# Patient Record
Sex: FEMALE | Race: WHITE | NOT HISPANIC OR LATINO | URBAN - METROPOLITAN AREA
[De-identification: names, ages, dates, MRNs, and addresses within clinical notes are randomized per-mention and may not be internally consistent; named-entity substitution may affect disease eponyms.]

---

## 2024-03-04 ENCOUNTER — APPOINTMENT (OUTPATIENT)
Dept: URBAN - METROPOLITAN AREA SURGERY 20 | Age: 69
Setting detail: DERMATOLOGY
End: 2024-03-05

## 2024-03-04 VITALS — HEART RATE: 83 BPM | SYSTOLIC BLOOD PRESSURE: 150 MMHG | TEMPERATURE: 98 F | DIASTOLIC BLOOD PRESSURE: 85 MMHG

## 2024-03-04 PROBLEM — C43.39 MALIGNANT MELANOMA OF OTHER PARTS OF FACE: Status: ACTIVE | Noted: 2024-03-04

## 2024-03-04 PROCEDURE — 12053 INTMD RPR FACE/MM 5.1-7.5 CM: CPT

## 2024-03-04 PROCEDURE — OTHER ADDITIONAL PATHOLOGY SPECIMEN: OTHER

## 2024-03-04 PROCEDURE — 88342 IMHCHEM/IMCYTCHM 1ST ANTB: CPT | Mod: 59

## 2024-03-04 PROCEDURE — OTHER MIPS QUALITY: OTHER

## 2024-03-04 PROCEDURE — OTHER MOHS SURGERY: OTHER

## 2024-03-04 PROCEDURE — 17311 MOHS 1 STAGE H/N/HF/G: CPT

## 2024-03-04 NOTE — PROCEDURE: MIPS QUALITY
Quality 47: Advance Care Plan: Advance Care Planning discussed and documented in the medical record; patient did not wish or was not able to name a surrogate decision maker or provide an advance care plan.
Quality 226: Preventive Care And Screening: Tobacco Use: Screening And Cessation Intervention: Patient screened for tobacco use and is an ex/non-smoker
Detail Level: Detailed
Quality 137: Melanoma: Continuity Of Care - Recall System: Documentation of system reason(s) for not entering patient's information into a recall system (eg, melanoma being monitored by another physician provider)

## 2024-03-04 NOTE — PROCEDURE: ADDITIONAL PATHOLOGY SPECIMEN
Render Path Notes In Note?: Yes
Notification Instructions: Patient will be notified of biopsy results. However, patient instructed to call the office if not contacted within 2 weeks.
X Size Of Lesion In Cm (Optional): 0
Lab: [id number]
Billing Type: Third-Party Bill
Body Location Override (Optional - Billing Will Still Be Based On Selected Body Map Location If Applicable): right submandibular
Detail Level: Detailed
Biopsy Type: H and E (Permanent Final Margin)
Path Notes (To The Dermatopathologist): Immuno Mohs debulk submitted for MIS.\\nPrevious Biopsy #:-[id number]
Path Notes (To The Dermatopathologist): Mohs deep margin for MIS, rule out underlying desmoplastic melanoma
Path Notes (To The Dermatopathologist): Immuno mohs layer (  2.9x  2.3x  0.3) EMBED ONLY.

## 2024-03-04 NOTE — PROCEDURE: MOHS SURGERY
Referred To Plastics For Closure Text (Leave Blank If You Do Not Want): After obtaining clear surgical margins the patient was sent to plastics for surgical repair.  The patient understands they will receive post-surgical care and follow-up from the referring physician's office.
Bcc Superficial Pigmented Histology Text: There were aggregates of basaloid cells with pigment budding from the epidermis.
Is There Clinical Or Radiological Involvement Of A Named Nerve (Microscopic Perineural Invasion Handled In Stages Tab)?: No
Melolabial Interpolation Flap Text: An interpolation flap was designed on the adjacent paranasal cheek skin in a triangular island configuration.  The flap was then incised and elevated, and undermined to the pedicle base utilizing perforating arterioles for vascular supply.  The edges were bluntly undermined at the periphery of the donor and recipient beds.  Adipose tissue was trimmed from the distal flap.  The flap was then carried over and sutured into the wound using key buried vertical mattress sutures and multiple Prolene simple interrupted sutures.  The donor defect was closed with multiple buried sutures, and interrupted and running sutures.  Xeroform dressing was placed behind the flap's pedicle.
Consent 2/Introductory Paragraph: Mohs surgery was explained to the patient and consent was obtained. The risks, benefits and alternatives to therapy were discussed in detail. Specifically, the risks of infection, scarring, bleeding, prolonged wound healing, incomplete removal, allergy to anesthesia, nerve injury and recurrence were addressed. Prior to the procedure, the treatment site was clearly identified and confirmed by the patient. All components of Universal Protocol/PAUSE Rule completed.
Partial Purse String (Simple) Text: Given the location of the defect and the characteristics of the surrounding skin a simple purse string closure was deemed most appropriate.  Undermining was performed circumferentially around the surgical defect.  A purse string suture was then placed and tightened. Wound tension only allowed a partial closure of the circular defect.
Oculoplastic Surgeon Procedure Text (D): After obtaining clear surgical margins the patient was sent to oculoplastics for surgical repair.  The patient understands they will receive post-surgical care and follow-up from the referring physician's office.
Quadrants Reporting?: 0
Hemigard Postcare Instructions: The HEMIGARD strips are to remain completely dry for at least 5-7 days.
Show Mohs Stages In Case Summary (If You Hide Here Stages Will Be Calculated By Your Documentation In The Stages Tab)?: Yes
Anesthesia Volume In Cc: 2
Eyelid Full Thickness Repair - 49553: The eyelid defect was full thickness which required a wedge repair of the eyelid. Special care was taken to ensure that the eyelid margin was realligned when placing sutures.
Stage 8: Additional Anesthesia Type: 1% lidocaine with epinephrine
Pan-Cytokeratin - Negative Histology Text: CK staining demonstrates a normal density and pattern.
Ftsg Text: A wound-size template was made using a non-adherent dressing.  The template was then outlined on the donor site with gentian violet.  Local anesthesia was obtained.  The excision at the donor site was carried down to subcutaneous fat.  The harvested graft was placed on a saline gauze.  The donor site was closed by intermediate layered closure. The graft was defatted with blunt scissors.  The graft was then placed on the recipient site and secured at the perimeter.  Careful attention was given to even approximation of the wound edges.  A tie-over bolster dressing with Xeroform gauze and suture was placed over the graft to provide compression.  A pressure dressing was applied to the donor site.
Why Was The Change Made?: Please Select the Appropriate Response
Partial Purse String (Intermediate) Text: Given the location of the defect and the characteristics of the surrounding skin an intermediate purse string closure was deemed most appropriate.  Undermining was performed circumferentially around the surgical defect.  A purse string suture was then placed and tightened. Wound tension only allowed a partial closure of the circular defect.
Surgeon/Pathologist Verbiage (Will Incorporate Name Of Surgeon From Intro If Not Blank): operated in two distinct and integrated capacities as the surgeon and pathologist.
Island Pedicle Flap-Requiring Vessel Identification Text: The defect edges were debeveled with a #15 scalpel blade.  Given the location of the defect, shape of the defect and the proximity to free margins an island pedicle advancement flap was deemed most appropriate.  Using a sterile surgical marker, an appropriate advancement flap was drawn, based on the axial vessel mentioned above, incorporating the defect, outlining the appropriate donor tissue and placing the expected incisions within the relaxed skin tension lines where possible.    The area thus outlined was incised deep to adipose tissue with a #15 scalpel blade.  The skin margins were undermined to an appropriate distance in all directions around the primary defect and laterally outward around the island pedicle utilizing iris scissors.  There was minimal undermining beneath the pedicle flap.
Estimated Blood Loss (Cc): minimal
Tumor Debulked?: dermablade
Bi-Rhombic Flap Text: A bi-rhombic transposition flap was designed.  The flap was incised to the underlying adipose tissue.  The tissue surrounding the operative site was undermined extensively with blunt scissor dissection.    The flap was then incised, undermined, elevated, defatted, and carried over the primary defect.  The secondary defect was first closed by complex layered closure, moving the flap into the primary defect.  The superficial fascia and subcutaneous layers were closed with buried vertical mattress sutures.  The epidermis was then approximated with simple interrupted and running sutures.  Careful attention was given to eversion and even approximation of the wound edges.
Scc In Situ With Follicular Extension Histology Text: There was full thickness epidermal squamous cell atypia and proliferation in a SCCIS pattern with overlying hypogranulosis, parakeratosis, and follicular or adnexal extension.
Composite Graft Text: A defect size template was made with Telfa and placed on the aspect of the ear, where the convexity and curvature correctly matched the recipient bed.  The graft was excised to include the underlying cartilage and immediately placed in cold saline solution.  Hemostasis with electrocoagulation.  The donor defect was closed with buried vertical mattress sutures followed by simple interrupted sutures.  Standing tissue cones were removed by the standard technique.  Subcutaneous fat was trimmed from the graft.  The composite graft was then placed in the defect and secured with basting sutures.  The edges were precisely approximated with simple interrupted sutures, with care to provide edge eversion.
Orbicularis Oris Muscle Flap Text: The defect edges were debeveled with a #15 scalpel blade.  Given that the defect affected the competency of the oral sphincter an orbicularis oris muscle flap was deemed most appropriate to restore this competency and normal muscle function.  Using a sterile surgical marker, an appropriate flap was drawn incorporating the defect. The area thus outlined was incised with a #15 scalpel blade.
Same Histology In Subsequent Stages Text: The pattern and morphology of the tumor is as described in the first stage.
Asc Procedure Text (A): After obtaining clear surgical margins the patient was sent to an ASC for surgical repair.  The patient understands they will receive post-surgical care and follow-up from the ASC physician.
Mid-Level Procedure Text (F): After obtaining clear surgical margins the patient was sent to a mid-level provider for surgical repair.  The patient understands they will receive post-surgical care and follow-up from the mid-level provider.
Surgical Defect Length In Cm (Optional): 3.1
Closure 2 Information: This tab is for additional flaps and grafts, including complex repair and grafts and complex repair and flaps. You can also specify a different location for the additional defect, if the location is the same you do not need to select a new one. We will insert the automated text for the repair you select below just as we do for solitary flaps and grafts. Please note that at this time if you select a location with a different insurance zone you will need to override the ICD10 and CPT if appropriate.
Provider Procedure Text (A): After obtaining clear surgical margins the defect was repaired by another provider.
Localized Dermabrasion With Wire Brush Text: The patient was draped in routine manner.  Localized dermabrasion using 220 grit sterile sandpaper was performed in routine manner to papillary dermis. This spot dermabrasion is being performed to complete skin cancer reconstruction. It also will eliminate the other sun damaged precancerous cells that are known to be part of the regional effect of a lifetime's worth of sun exposure. This localized dermabrasion is therapeutic and should not be considered cosmetic in any regard.
Double Island Pedicle Flap Text: The defect edges were debeveled with a #15 scalpel blade.  Given the location of the defect, shape of the defect and the proximity to free margins a double island pedicle advancement flap was deemed most appropriate.  Using a sterile surgical marker, an appropriate advancement flap was drawn incorporating the defect, outlining the appropriate donor tissue and placing the expected incisions within the relaxed skin tension lines where possible.    The area thus outlined was incised deep to adipose tissue with a #15 scalpel blade.  The skin margins were undermined to an appropriate distance in all directions around the primary defect and laterally outward around the island pedicle utilizing iris scissors.  There was minimal undermining beneath the pedicle flap.
Nostril Rim Text: The closure involved the nostril rim.
Tarsorrhaphy Text: A tarsorrhaphy was performed using Frost sutures.
Wound Check: 28 days
Scc Well Differentiated Histology Text: There were aggregates of well-differentiated squamous cells.
No Repair - Repaired With Adjacent Surgical Defect Text (Leave Blank If You Do Not Want): After obtaining clear surgical margins the defect was repaired concurrently with the other surgical defect which was in close approximation.
Bilobed Transposition Flap Text: A geometric bilobed transposition flap was designed.  The flap was incised to the underlying adipose tissue. The flap was then undermined, elevated, and carried over the primary defect and sutured into place to its pedicle base.  The entire tissue surrounding the operative site was undermined extensively with blunt scissor dissection.  The primary lobe was then defatted using blunt scissor dissection.  The secondary lobe was carried over to cover the secondary defect created by the first lobe of the flap.  The primary lobe of the flap was then carried over the primary defect.  Both lobes of the flap were trimmed to fit the recipient sites as needed.  The superficial fascia and subcutaneous layers were closed with buried vertical mattress sutures.  The epidermis was approximated with simple interrupted and running sutures.  Careful attention was given to eversion and even approximation of the wound edges.
Bilateral Rotation Flap Text: A bilateral rotation flap was designed.  The flap was incised to the underlying adipose tissue.  The tissue surrounding the operative site was undermined extensively with blunt scissor dissection.  The flap was then undermined, elevated, rotated and carried over the primary defect and sutured into place.  Hemostasis was obtained using electrocoagulation.  Raised tissue cones were removed as necessary by standard technique.  The superficial fascia and subcutaneous layers were closed with buried vertical mattress sutures.  The epidermis was then approximated with simple interrupted and running sutures.  Careful attention was given to eversion and even approximation of the wound edges.
Scc Pigmented Histology Text: There were aggregates of squamous cells with pigment.
Consent (Lip)/Introductory Paragraph: The rationale for Mohs was explained to the patient and consent was obtained. The risks, benefits and alternatives to therapy were discussed in detail. Specifically, the risks of lip deformity, changes in the oral aperture, infection, scarring, bleeding, prolonged wound healing, incomplete removal, allergy to anesthesia, nerve injury and recurrence were addressed. Prior to the procedure, the treatment site was clearly identified and confirmed by the patient. All components of Universal Protocol/PAUSE Rule completed.
Manual Repair Warning Statement: We plan on removing the manually selected variable below in favor of our much easier automatic structured text blocks found in the previous tab. We decided to do this to help make the flow better and give you the full power of structured data. Manual selection is never going to be ideal in our platform and I would encourage you to avoid using manual selection from this point on, especially since I will be sunsetting this feature. It is important that you do one of two things with the customized text below. First, you can save all of the text in a word file so you can have it for future reference. Second, transfer the text to the appropriate area in the Library tab. Lastly, if there is a flap or graft type which we do not have you need to let us know right away so I can add it in before the variable is hidden. No need to panic, we plan to give you roughly 6 months to make the change.
Inflammation Suggestive Of Cancer Camouflage Histology Text: There was a dense lymphocytic infiltrate which prevented adequate histologic evaluation of adjacent structures and camouflaged potential tumor present.
Number Of Hemigard Strips Per Side: 1
Lazy S Intermediate Repair Preamble Text (Leave Blank If You Do Not Want): Undermining was performed with blunt dissection.
Postop Diagnosis: same
Hemostasis: Electrocoagulation
Retention Suture Text: Retention sutures were placed to support the closure and prevent dehiscence.
Otolaryngologist Procedure Text (E): After obtaining clear surgical margins the patient was sent to otolaryngology for surgical repair.  The patient understands they will receive post-surgical care and follow-up from the referring physician's office.
Special Stains Stage 8 - Results: Base On Clearance Noted Above
Mixed Nodular And Micronodular Bcc Histology Text: There were aggregates of basaloid cells in a nodular and micro nodular pattern.
Bcc Pigmented Histology Text: There were aggregates of basaloid cells with pigment.
Split-Thickness Skin Graft Text: The defect edges were debeveled with a #15 scalpel blade.  Given the location of the defect, shape of the defect and the proximity to free margins a split thickness skin graft was deemed most appropriate.  Using a sterile surgical marker, the primary defect shape was transferred to the donor site. The split thickness graft was then harvested.  The skin graft was then placed in the primary defect and oriented appropriately.
V-Y Plasty Text: The defect edges were debeveled with a #15 scalpel blade.  Given the location of the defect, shape of the defect and the proximity to free margins an V-Y advancement flap was deemed most appropriate.  Using a sterile surgical marker, an appropriate advancement flap was drawn incorporating the defect and placing the expected incisions within the relaxed skin tension lines where possible.    The area thus outlined was incised deep to adipose tissue with a #15 scalpel blade.  The skin margins were undermined to an appropriate distance in all directions utilizing iris scissors.
O-Z Flap Text: An O-Z rotation flap was designed.  The flap was incised to the underlying adipose tissue.  The flap was then undermined, elevated, and carried over the primary defect and sutured into place and elevated, rotated, and carried over into the primary defect, creating a secondary defect.  Raised tissue cones were removed as necessary by standard technique. The superficial fascia and subcutaneous layers were closed with buried vertical mattress sutures. The epidermis was then approximated with simple interrupted and running sutures.  Careful attention was given to eversion and even approximation of the wound edges.
Subsequent Stages Histo Method Verbiage: Using a similar technique to that described above, a thin layer of tissue was removed from all areas where tumor was visible on the previous stage.  The tissue was again oriented, mapped, dyed, and processed as above.
Rhomboid Transposition Flap Text: A rhombic transposition flap was designed.  The flap was incised to the underlying adipose tissue.  The tissue surrounding the operative site was undermined extensively with blunt scissor dissection.    The flap was then incised, undermined, elevated, defatted, and carried over the primary defect.  The secondary defect was first closed by complex layered closure, moving the flap into the primary defect.  The superficial fascia and subcutaneous layers were closed with buried vertical mattress sutures.  The epidermis was then approximated with simple interrupted and running sutures.  Careful attention was given to eversion and even approximation of the wound edges.
Bcc  Nodular Histology Text: There were numerous large/nodular aggregates of palisaded basaloid cells with peripheral clefting
Consent 1/Introductory Paragraph: The rationale for Mohs was explained to the patient and consent was obtained. The risks, benefits and alternatives to therapy were discussed in detail. Specifically, the risks of infection, scarring, bleeding, prolonged wound healing, incomplete removal, allergy to anesthesia, nerve injury and recurrence were addressed. Prior to the procedure, the treatment site was clearly identified and confirmed by the patient. All components of Universal Protocol/PAUSE Rule completed.
Xenograft Text: The defect edges were debeveled with a #15 scalpel blade.  Given the location of the defect, shape of the defect and the proximity to free margins a xenograft was deemed most appropriate.  The graft was then trimmed to fit the size of the defect.  The graft was then placed in the primary defect and oriented appropriately.
Cheiloplasty (Complex) Text: A decision was made to reconstruct the defect with a  cheiloplasty.  The defect was undermined extensively.  Additional orbicularis oris muscle was excised with a 15 blade scalpel.  The defect was converted into a full thickness wedge to facilite a better cosmetic result.  Small vessels were then tied off with 5-0 monocyrl. The orbicularis oris, superficial fascia, adipose and dermis were then reapproximated.  After the deeper layers were approximated the epidermis was reapproximated with particular care given to realign the vermilion border.
Burow's Advancement Flap Text: The defect edges were debeveled with a #15 scalpel blade.  Given the location of the defect and the proximity to free margins a Burow's advancement flap was deemed most appropriate.  Using a sterile surgical marker, the appropriate advancement flap was drawn incorporating the defect and placing the expected incisions within the relaxed skin tension lines where possible.    The area thus outlined was incised deep to adipose tissue with a #15 scalpel blade.  The skin margins were undermined to an appropriate distance in all directions utilizing iris scissors and carried over to close the primary defect.
Consent (Spinal Accessory)/Introductory Paragraph: The rationale for Mohs was explained to the patient and consent was obtained. The risks, benefits and alternatives to therapy were discussed in detail. Specifically, the risks of damage to the spinal accessory nerve, infection, scarring, bleeding, prolonged wound healing, incomplete removal, allergy to anesthesia, and recurrence were addressed. Prior to the procedure, the treatment site was clearly identified and confirmed by the patient. All components of Universal Protocol/PAUSE Rule completed.
Scc Acantholytic Histology Text: There were aggregates of squamous cells in an acantholytic pattern.
Body Location Override (Optional - Billing Will Still Be Based On Selected Body Map Location If Applicable): right submandibular
Donor Site Anesthesia Type: same as repair anesthesia
H Plasty Text: Given the location of the defect, shape of the defect and the proximity to free margins a H-plasty was deemed most appropriate for repair.  Using a sterile surgical marker, the appropriate advancement arms of the H-plasty were drawn incorporating the defect and placing the expected incisions within the relaxed skin tension lines where possible. The area thus outlined was incised deep to adipose tissue with a #15 scalpel blade. The skin margins were undermined to an appropriate distance in all directions utilizing iris scissors.  The opposing advancement arms were then advanced into place in opposite direction and anchored with interrupted buried subcutaneous sutures.
Graft Donor Site Bandage (Optional-Leave Blank If You Don't Want In Note): Pressure bandage were applied to the donor site.
Surgical Defect Width In Cm (Optional): 2.5
Double O-Z Plasty Text: The defect edges were debeveled with a #15 scalpel blade.  Given the location of the defect, shape of the defect and the proximity to free margins a Double O-Z plasty (double transposition flap) was deemed most appropriate.  Using a sterile surgical marker, the appropriate transposition flaps were drawn incorporating the defect and placing the expected incisions within the relaxed skin tension lines where possible. The area thus outlined was incised deep to adipose tissue with a #15 scalpel blade.  The skin margins were undermined to an appropriate distance in all directions utilizing iris scissors.  Hemostasis was achieved with electrocautery.  The flaps were then transposed into place, one clockwise and the other counterclockwise, and anchored with interrupted buried subcutaneous sutures.
Bcc Infundibulocystic Histology Text: There were aggregates of basaloid cells demonstrating an infundibulocystic pattern.
Intermediate Repair And Flap Additional Text (Will Appearing After The Standard Complex Repair Text): The intermediate repair was not sufficient to completely close the primary defect. The remaining additional defect was repaired with the flap mentioned below.
Epidermal Closure Graft Donor Site (Optional): simple interrupted
Consent (Near Eyelid Margin)/Introductory Paragraph: The rationale for Mohs was explained to the patient and consent was obtained. The risks, benefits and alternatives to therapy were discussed in detail. Specifically, the risks of ectropion or eyelid deformity, infection, scarring, bleeding, prolonged wound healing, incomplete removal, allergy to anesthesia, nerve injury and recurrence were addressed. Prior to the procedure, the treatment site was clearly identified and confirmed by the patient. All components of Universal Protocol/PAUSE Rule completed.
Closure 3 Information: This tab is for additional flaps and grafts above and beyond our usual structured repairs.  Please note if you enter information here it will not currently bill and you will need to add the billing information manually.
Advancement Flap (Single) Text: An advancement flap was designed.  The tissue surrounding the operative site was undermined extensively with blunt scissors dissection.  The flap was incised to the underlying adipose tissue.  The flap was then undermined, elevated, and carried over the primary defect and sutured into place.  Raised tissue cones were removed as necessary by standard technique.  The superficial fascia and subcutaneous layers were closed with buried vertical mattress sutures.  The epidermis was then approximated with simple interrupted and running sutures.  Careful attention was given to eversion and even approximation of the wound edges.
Additional Debulk Details (Optional): Tumor
Secondary Intention Text (Leave Blank If You Do Not Want): The defect will heal with secondary intention.
Abbe Flap (Lower To Upper Lip) Text: The defect of the upper lip was assessed and measured.  Given the location and size of the defect, an Abbe flap was deemed most appropriate.  Using a sterile surgical marker, an appropriate Abbe flap was measured and drawn on the lower lip. Local anesthesia was then infiltrated. A scalpel was then used to incise the upper lip through and through the skin, vermilion, muscle and mucosa, leaving the flap pedicled on the opposite side.  The flap was then rotated and transferred to the lower lip defect.  The flap was then sutured into place with a three layer technique, closing the orbicularis oris muscle layer with subcutaneous buried sutures, followed by a mucosal layer and an epidermal layer.
Intermediate Repair And Graft Additional Text (Will Appearing After The Standard Complex Repair Text): The intermediate repair was not sufficient to completely close the primary defect. The remaining additional defect was repaired with the graft mentioned below.
Banner Transposition Flap Text: A transposition flap with a banner configuration was delineated in the adjacent donor area.  The flap was incised, widely undermined, elevated, and carried over the primary defect, then sutured into place.  Key buried subcutaneous sutures were placed to close the donor area.  The flap was then secured in place with buried vertical mattress sutures and the epidermis was approximated with simple interrupted and running sutures.  The donor area was similarly closed.  All tissue redundancies were repaired in standard fashion.
Graft Cartilage Fenestration Text: The area was anesthetized to include the underlying cartilage and soft tissue on the opposite surface.  3 mm punch fenestrations were performed through the cartilage to expose the underlying perichondrium and subcutaneous tissue.  The fenestrations improve wound healing by expediting granulation tissue formation and providing a vascular bed for graft nourishment
Mart-1 - Negative Histology Text: MART-1 staining demonstrates a normal density and pattern of melanocytes along the dermal-epidermal junction. The surgical margins are negative for tumor cells.
Complex Repair Preamble Text (Leave Blank If You Do Not Want): The beveled, asymmetric Mohs defect was debeveled with a scalpel to create vertical tissue edges for proper reapproximation.  The tissue surrounding the operative site was undermined extensively with blunt scissor dissection to reduce tension and prevent tissue distortion.  The orientation of the closure was placed within the relaxed skin tension lines.  The superficial fascia and subcutaneous layers were then closed using buried vertical mattress sutures.  The epidermis was then approximated with simple interrupted and running sutures. Careful attention was given to eversion and even approximation of the edges.  Standing tissue cones were removed as necessary by standard technique.  A pressure dressing was applied.
Mixed Nodular And Infiltrative Bcc Histology Text: There were aggregates of basaloid cells in a nodular and infiltrative pattern.
Scc Crateriform Histology Text: There were aggregates of squamous cells.
Dermal Autograft Text: The defect edges were debeveled with a #15 scalpel blade.  Given the location of the defect, shape of the defect and the proximity to free margins a dermal autograft was deemed most appropriate.  Using a sterile surgical marker, the primary defect shape was transferred to the donor site. The area thus outlined was incised deep to adipose tissue with a #15 scalpel blade.  The harvested graft was then trimmed of adipose and epidermal tissue until only dermis was left.  The skin graft was then placed in the primary defect and oriented appropriately.
Dorsal Nasal Flap Text: The defect edges were debeveled with a #15 scalpel blade.  Given the location of the defect and the proximity to free margins a dorsal nasal flap was deemed most appropriate.  Using a sterile surgical marker, an appropriate dorsal nasal flap was drawn around the defect.    The area thus outlined was incised deep to adipose tissue with a #15 scalpel blade.  The skin margins were undermined to an appropriate distance in all directions utilizing iris scissors.
Muscle Hinge Flap Text: The defect edges were debeveled with a #15 scalpel blade.  Given the size, depth and location of the defect and the proximity to free margins a muscle hinge flap was deemed most appropriate.  Using a sterile surgical marker, an appropriate hinge flap was drawn incorporating the defect. The area thus outlined was incised with a #15 scalpel blade.  The skin margins were undermined to an appropriate distance in all directions utilizing iris scissors.
Hemigard Retention Suture: 2-0 Nylon
Complex Repair And Graft Additional Text (Will Appearing After The Standard Complex Repair Text): The complex repair was not sufficient to completely close the primary defect. The remaining additional defect was repaired with the graft mentioned below.
Undermining Type: Entire Wound
Bcc  Nodulocystic Histology Text: There were aggregates of basaloid cells.
Wound Care: Petrolatum
Consent (Marginal Mandibular)/Introductory Paragraph: The rationale for Mohs was explained to the patient and consent was obtained. The risks, benefits and alternatives to therapy were discussed in detail. Specifically, the risks of damage to the marginal mandibular branch of the facial nerve, infection, scarring, bleeding, prolonged wound healing, incomplete removal, allergy to anesthesia, and recurrence were addressed. Prior to the procedure, the treatment site was clearly identified and confirmed by the patient. All components of Universal Protocol/PAUSE Rule completed.
Anesthesia Type: 1% lidocaine with 1:100,000 epinephrine and 408mcg clindamycin/ml and a 1:10 solution of 8.4% sodium bicarbonate
Mercedes Flap Text: The tissue surrounding the operative site was undermined extensively with blunt scissor dissection.  A mercedes advancement flap was designed.  The flaps were incised to the underlying adipose tissue.  The flaps were then undermined, elevated, and carried over the primary defect and sutured into place.  The flaps were then advanced into the primary defect.  Raised tissue cones were removed as necessary by standard technique.  The superficial fascia and subcutaneous layers were closed with buried vertical mattress sutures.  The epidermis was then approximated with simple interrupted and running sutures.  Careful attention was given to eversion and even approximation of the wound edges.
Ear Wedge Repair Text: A full helical wedge was designed.  The excision removing the full helical wedge was performed using a #15 blade.  The wound was closed in separate sequential layers (cartilage, skin). The sutures were placed from the apex upward with buried sutures and simple interrupted and running epidermal sutures.  The helical rim was carefully reapproximated with sutures to provide eversion with buried vertical mattress and epidermal mattress interrupted and running sutures for eversion and even approximation.
Alternatives Discussed Intro (Do Not Add Period): I discussed alternative treatments to Mohs surgery and specifically discussed the risks and benefits of
Mohs Method Verbiage: An incision at a 45 degree angle following the standard Mohs approach was done and the specimen was harvested as a microscopic controlled layer.
Double M-Plasty Complex Repair Preamble Text (Leave Blank If You Do Not Want): Extensive wide undermining was performed.
Estlander Flap (Lower To Upper Lip) Text: The defect of the lower lip was assessed and measured.  Given the location and size of the defect, an Estlander flap was deemed most appropriate.  Using a sterile surgical marker, an appropriate Estlander flap was measured and drawn on the upper lip. Local anesthesia was then infiltrated. A scalpel was then used to incise the lateral aspect of the flap, through skin, muscle and mucosa, leaving the flap pedicled medially.  The flap was then rotated and positioned to fill the lower lip defect.  The flap was then sutured into place with a three layer technique, closing the orbicularis oris muscle layer with subcutaneous buried sutures, followed by a mucosal layer and an epidermal layer.
Incidental Superficial Basal Cell Carcinoma Histology Text: Incidental superficial BCC was noted at the periphery of the Mohs section.  If clear margins were not obtained, patient was advised to considered 35%TCA followed by topical 5FU once fully healed.
Area H Indication Text: Tumors in this location are included in Area H (eyelids, eyebrows, nose, lips, chin, ear, pre-auricular, post-auricular, temple, genitalia, hands, feet, ankles and areola).  Tissue conservation is critical in these anatomic locations.
Posterior Auricular Interpolation Flap Text: An area of non-hair-bearing postauricular skin was delineated, with width corresponding the length of the auricular wound.  The flap was then incised and undermined at the level of the underlying fascia.  The ear was pinned back with simple sutures above and/or below the defect to reduce tension on the flap and improve flap reach.  The distal flap was then elevated, carried over the primary defect, and sutured into the ear wound with multiple buried vertical mattress sutures to provide eversion along the helical rim and multiple simple and running epidermal sutures.  A Xeroform gauze dressing was placed behind the flap pedicle.
Mohs Histo Method Verbiage: Each section was then chromacoded and processed in the Mohs lab using the Mohs protocol and submitted for frozen section.
Rotation Flap Text: A rotation flap was designed.  The flap was incised to the underlying adipose tissue.  The tissue surrounding the operative site was undermined extensively with blunt scissor dissection.  The flap was then undermined, elevated, rotated and carried over the primary defect and sutured into place.  Hemostasis was obtained using electrocoagulation.  Raised tissue cones were removed as necessary by standard technique.  The superficial fascia and subcutaneous layers were closed with buried vertical mattress sutures.  The epidermis was then approximated with simple interrupted and running sutures.  Careful attention was given to eversion and even approximation of the wound edges.
Presence Of Scar Tissue (For Histology): absent
Dressing (No Sutures): steri-strips and pressure dressing
Dressing: pressure dressing with telfa
Where Do You Want The Question To Include Opioid Counseling Located?: Case Summary Tab
W Plasty Text: The lesion was extirpated to the level of the fat with a #15 scalpel blade.  Given the location of the defect, shape of the defect and the proximity to free margins a W-plasty was deemed most appropriate for repair.  Using a sterile surgical marker, the appropriate transposition arms of the W-plasty were drawn incorporating the defect and placing the expected incisions within the relaxed skin tension lines where possible.    The area thus outlined was incised deep to adipose tissue with a #15 scalpel blade.  The skin margins were undermined to an appropriate distance in all directions utilizing iris scissors.  The opposing transposition arms were then transposed into place in opposite direction and anchored with interrupted buried subcutaneous sutures.
Bcc Superficial Histology Text: There were aggregates of basaloid cells budding from the epidermis.
Epidermal Closure: running
Crescentic Advancement Flap Text: The defect edges were debeveled with a #15 scalpel blade.  Given the location of the defect and the proximity to free margins a crescentic advancement flap was deemed most appropriate.  Using a sterile surgical marker, the appropriate advancement flap was drawn incorporating the defect and placing the expected incisions within the relaxed skin tension lines where possible.    The area thus outlined was incised deep to adipose tissue with a #15 scalpel blade.  The skin margins were undermined to an appropriate distance in all directions utilizing iris scissors and carried over to close the primary defect.
Post-Care Instructions: I reviewed with the patient in detail post-care instructions. Patient is not to engage in any heavy lifting, exercise, or swimming for the next 14 days. Should the patient develop any fevers, chills, bleeding, severe pain patient will contact the office immediately.
Area Suspicious For Tumor Histology Text: An area suspicious for additional tumor was noted and excised with additional stage(s).
Full Thickness Lip Wedge Repair (Flap) Text: The vermilion border of the lip adjacent to the wound was carefully marked with gentian violet.  A full lip wedge was designed. The excision removing the full lip wedge was performed using a #15 blade.  The labial arteries were isolated and indirectly electrocoagulated.  The wound was closed in separate sequential layers (labial mucosa, orbicularis oris muscle and skin) using buried sutures and mucosal and epidermal simple interrupted, running and locked running sutures.  The sutures were placed from the apex upward. The vermilion border was carefully reapproximated.
Keystone Flap Text: The defect edges were debeveled with a #15 scalpel blade.  Given the location of the defect, shape of the defect a keystone flap was deemed most appropriate.  Using a sterile surgical marker, an appropriate keystone flap was drawn incorporating the defect, outlining the appropriate donor tissue and placing the expected incisions within the relaxed skin tension lines where possible. The area thus outlined was incised deep to adipose tissue with a #15 scalpel blade.  The skin margins were undermined to an appropriate distance in all directions around the primary defect and laterally outward around the flap utilizing iris scissors.
Epidermal Sutures: 6-0 Fast Absorbing Gut
Hemigard Intro: Due to skin fragility and wound tension, it was decided to use HEMIGARD adhesive retention suture devices to permit a linear closure. The skin was cleaned and dried for a 6cm distance away from the wound. Excessive hair, if present, was removed to allow for adhesion.
Scc Poorly Differentiated Histology Text: There were aggregates of poorly-differentiated squamous cells.
Mohs Rapid Report Verbiage: The area of clinically evident tumor was marked with skin marking ink and appropriately hatched.  The initial incision was made following the Mohs approach through the skin.  The specimen was taken to the lab, divided into the necessary number of pieces, chromacoded and processed according to the Mohs protocol.  This was repeated in successive stages until a tumor free defect was achieved.
Consent (Temporal Branch)/Introductory Paragraph: The rationale for Mohs was explained to the patient and consent was obtained. The risks, benefits and alternatives to therapy were discussed in detail. Specifically, the risks of damage to the temporal branch of the facial nerve, infection, scarring, bleeding, prolonged wound healing, incomplete removal, allergy to anesthesia, and recurrence were addressed. Prior to the procedure, the treatment site was clearly identified and confirmed by the patient. All components of Universal Protocol/PAUSE Rule completed.
Scc Moderately Differentiated Histology Text: There were aggregates of moderately-differentiated squamous cells.
Anesthesia Volume In Cc: 6
Bcc Micronodular Histology Text: There were numerous small/micronodular aggregates of palisaded basaloid cells with peripheral clefting
Consent 3/Introductory Paragraph: I gave the patient a chance to ask questions they had about the procedure.  Following this I explained the Mohs procedure and consent was obtained. The risks, benefits and alternatives to therapy were discussed in detail. Specifically, the risks of infection, scarring, bleeding, prolonged wound healing, incomplete removal, allergy to anesthesia, nerve injury and recurrence were addressed. Prior to the procedure, the treatment site was clearly identified and confirmed by the patient. All components of Universal Protocol/PAUSE Rule completed.
Pinch Graft Text: The defect edges were debeveled with a #15 scalpel blade. Given the location of the defect, shape of the defect and the proximity to free margins a pinch graft was deemed most appropriate. Using a sterile surgical marker, the primary defect shape was transferred to the donor site. The area thus outlined was incised deep to adipose tissue with a #15 scalpel blade.  The harvested graft was then trimmed of adipose tissue until only dermis and epidermis was left. The skin margins of the secondary defect were undermined to an appropriate distance in all directions utilizing iris scissors.  The secondary defect was closed with interrupted buried subcutaneous sutures.  The skin edges were then re-apposed with running  sutures.  The skin graft was then placed in the primary defect and oriented appropriately.
O-Z Plasty Text: The defect edges were debeveled with a #15 scalpel blade.  Given the location of the defect, shape of the defect and the proximity to free margins an O-Z plasty (double transposition flap) was deemed most appropriate.  Using a sterile surgical marker, the appropriate transposition flaps were drawn incorporating the defect and placing the expected incisions within the relaxed skin tension lines where possible.    The area thus outlined was incised deep to adipose tissue with a #15 scalpel blade.  The skin margins were undermined to an appropriate distance in all directions utilizing iris scissors.  Hemostasis was achieved with electrocautery.  The flaps were then transposed into place, one clockwise and the other counterclockwise, and anchored with interrupted buried subcutaneous sutures.
Skin Substitute Text: The defect edges were debeveled with a #15 scalpel blade.  Given the location of the defect, shape of the defect and the proximity to free margins a skin substitute graft was deemed most appropriate.  The graft material was trimmed to fit the size of the defect. The graft was then placed in the primary defect and oriented appropriately.
Consent (Nose)/Introductory Paragraph: The rationale for Mohs was explained to the patient and consent was obtained. The risks, benefits and alternatives to therapy were discussed in detail. Specifically, the risks of nasal deformity, changes in the flow of air through the nose, infection, scarring, bleeding, prolonged wound healing, incomplete removal, allergy to anesthesia, nerve injury and recurrence were addressed. Prior to the procedure, the treatment site was clearly identified and confirmed by the patient. All components of Universal Protocol/PAUSE Rule completed.
Non-Graft Cartilage Fenestration Text: The area was anesthetized to include the underlying cartilage and soft tissue on the opposite surface.  3 mm punch fenestrations were performed through the cartilage to expose the underlying perichondrium and subcutaneous tissue.  The fenestrations improve wound healing by expediting granulation tissue formation and re-epithelization in the bare cartilage defect base.
Consent (Scalp)/Introductory Paragraph: The rationale for Mohs was explained to the patient and consent was obtained. The risks, benefits and alternatives to therapy were discussed in detail. Specifically, the risks of changes in hair growth pattern secondary to repair, infection, scarring, bleeding, prolonged wound healing, incomplete removal, allergy to anesthesia, nerve injury and recurrence were addressed. Prior to the procedure, the treatment site was clearly identified and confirmed by the patient. All components of Universal Protocol/PAUSE Rule completed.
Referring Physician (Optional): Roseanna Hardy MD
O-L Flap Text: The defect edges were debeveled with a #15 scalpel blade.  Given the location of the defect, shape of the defect and the proximity to free margins an O-L flap was deemed most appropriate.  Using a sterile surgical marker, an appropriate advancement flap was drawn incorporating the defect and placing the expected incisions within the relaxed skin tension lines where possible.    The area thus outlined was incised deep to adipose tissue with a #15 scalpel blade.  The skin margins were undermined to an appropriate distance in all directions utilizing iris scissors and carried over to close the primary defect.
Burow's Graft Text: The tissue surrounding the operative site was extensively undermined.  A raised cone was removed from one end of the defect to serve as a full thickness graft. This area was then closed by complex layered closure using buried vertical mattress sutures to close the superficial fascia and subcutaneous fat, and simple interrupted and running sutures to approximate the epidermis.  The obtained graft was defatted and placed in saline gauze.  The graft was trimmed to fit the defect as needed with blunt scissors.  The graft was then sutured into the recipient site with interrupted and basting sutures.  Careful attention was given to even approximation of the wound edges.
Trilobed Flap Text: A geometric trilobed transposition flap was designed utilizing 45 degree transposition of each flap lobe to direct tension vectors horizontally along the nasal sidewall to prevent distortion of the alar rim.  The flap was incised to the underlying adipose tissue. The flap was then undermined, elevated, and carried over the primary defect and sutured into place to its pedicle base.  The entire tissue surrounding the operative site was undermined extensively with blunt scissor dissection.  The elliptical tertiary donor area was first closed.  The tertiary lobe was transposed, and carried over to cover the tertiary defect created by the second lobe.  The secondary lobe was transposed, and carried over to cover the secondary defect created by the first lobe of the flap.  The primary lobe of the flap was then transposed, and carried over over the primary defect.  All lobes of the flap were trimmed to fit the recipient sites as needed.  The superficial fascia and subcutaneous layers were closed with buried vertical mattress sutures.  The epidermis was approximated with simple interrupted and running sutures.  Careful attention was given to eversion and even approximation of the wound edges.
Missing Epidermis Histology Text: Missing tissue was noted on frozen sections and additional slides were cut until present.  If no additional tissue containing epidermis was available, then an additional stage was excised.
Bilateral Helical Rim Advancement Flap Text: A bilateral helical advancement flap was designed. The tissue surrounding the operative site was undermined extensively with blunt scissor dissection.  A single incision along the helical sulcus into the earlobe interiorly was made to the underlying adipose tissue.  The flap was then incised and undermined along the cartilage in the subcutaneous fat and elevated.  The flap was then carried over and advanced superiorly into the primary defect. Raised tissue cones were removed as necessary by standard technique on the posterior pinna and anterior earlobe.  The superficial fascia and subcutaneous layers were closed with buried vertical mattress sutures.  The epidermis was then approximated with simple interrupted and running sutures. Careful attention was given to eversion and even approximation of the wound edges.
Brow Lift Text: A midfrontal incision was made medially to the defect to allow access to the tissues just superior to the left eyebrow. Following careful dissection inferiorly in a supraperiosteal plane to the level of the left eyebrow, several 3-0 monocryl sutures were used to resuspend the eyebrow orbicularis oculi muscular unit to the superior frontal bone periosteum. This resulted in an appropriate reapproximation of static eyebrow symmetry and correction of the left brow ptosis.
Suturegard Intro: Intraoperative tissue expansion was performed, utilizing the SUTUREGARD device, in order to reduce wound tension.
Information: Selecting Yes will display possible errors in your note based on the variables you have selected. This validation is only offered as a suggestion for you. PLEASE NOTE THAT THE VALIDATION TEXT WILL BE REMOVED WHEN YOU FINALIZE YOUR NOTE. IF YOU WANT TO FAX A PRELIMINARY NOTE YOU WILL NEED TO TOGGLE THIS TO 'NO' IF YOU DO NOT WANT IT IN YOUR FAXED NOTE.
Melolabial Transposition Flap Text: A nasolabial transposition flap was designed as a single stage procedure.  The incision lines were designed along the nasolabial fold and medial cheek.  Removal of the superior tissue cone was designed by triangulation with the apex pointed toward the medial canthus.  The flap was incised to the underlying adipose tissue.  The flap was then undermined, elevated, and carried over the primary defect and sutured into place.  The tissue surrounding the operative site was undermined extensively with blunt scissor dissection.  On the nose and medial cheek this was performed at the level of the periosteum. Hemostasis was obtained using electrocoagulation.  The flap was then incised, and defatted using blunt scissor dissection.  The cheek was then carried over and advanced medially utilizing pyriform aperture periosteal tacking sutures, carrying over the flap into the primary defect and to recreate the alar crease concavity.  The secondary defect was then closed by complex layered closure. The primary defect wound bed was trimmed as necessary to create a concave surface at the flap was sutured into place.  The superficial fascia and subcutaneous layers were closed with buried vertical mattress sutures.  The epidermis was approximated with simple interrupted and running sutures.  Careful attention was given to eversion and even approximation of the wound edges.  A pressure dressing was applied.
Eye Protection Verbiage: Before proceeding with the stage, a plastic scleral shield was inserted. The globe was anesthetized with a few drops of 1% lidocaine with 1:100,000 epinephrine. Then, an appropriate sized scleral shield was chosen and coated with lacrilube ointment. The shield was gently inserted and left in place for the duration of each stage. After the stage was completed, the shield was gently removed.
Chonodrocutaneous Helical Advancement Flap Text: A helical advancement flap was designed. The tissue surrounding the operative site was undermined extensively with blunt scissor dissection.  A single incision along the helical sulcus into the earlobe interiorly was made to the underlying adipose tissue.  The flap was then undermined along the cartilage in the subcutaneous fat and elevated.  The flap was then carried over and advanced superiorly into the primary defect. Raised tissue cones were removed as necessary by standard technique on the posterior pinna and anterior earlobe.  The superficial fascia and subcutaneous layers were closed with buried vertical mattress sutures.  The epidermis was then approximated with simple interrupted and running sutures. Careful attention was given to eversion and even approximation of the wound edges.
Incidental Actinic Keratosis Histology Text: Incidental AK was noted at the periphery of the Mohs section destruction was performed, pt was was advised to monitor, and/or patient was advised to considered topical 5FU once fully healed.
Nasal Turnover Hinge Flap Text: The defect edges were debeveled with a #15 scalpel blade.  Given the size, depth, location of the defect and the defect being full thickness a nasal turnover hinge flap was deemed most appropriate.  Using a sterile surgical marker, an appropriate hinge flap was drawn incorporating the defect. The area thus outlined was incised with a #15 scalpel blade. The flap was designed to recreate the nasal mucosal lining and the alar rim. The skin margins were undermined to an appropriate distance in all directions utilizing iris scissors.
A-T Advancement Flap Text: The defect edges were debeveled with a #15 scalpel blade.  Given the location of the defect, shape of the defect and the proximity to free margins an A-T advancement flap was deemed most appropriate.  Using a sterile surgical marker, an appropriate advancement flap was drawn incorporating the defect and placing the expected incisions within the relaxed skin tension lines where possible.    The area thus outlined was incised deep to adipose tissue with a #15 scalpel blade.  The skin margins were undermined to an appropriate distance in all directions utilizing iris scissors and carried over to close the primary defect.
Epidermal Autograft Text: The defect edges were debeveled with a #15 scalpel blade.  Given the location of the defect, shape of the defect and the proximity to free margins an epidermal autograft was deemed most appropriate.  Using a sterile surgical marker, the primary defect shape was transferred to the donor site. The epidermal graft was then harvested.  The skin graft was then placed in the primary defect and oriented appropriately.
No Residual Tumor Seen Histology Text: There were no malignant cells seen in the sections examined.
Staged Advancement Flap Text: The defect edges were debeveled with a #15 scalpel blade.  Given the location of the defect, shape of the defect and the proximity to free margins a staged advancement flap was deemed most appropriate.  Using a sterile surgical marker, an appropriate advancement flap was drawn incorporating the defect and placing the expected incisions within the relaxed skin tension lines where possible. The area thus outlined was incised deep to adipose tissue with a #15 scalpel blade.  The skin margins were undermined to an appropriate distance in all directions utilizing iris scissors and carried over to close the primary defect.  That flap was incised, undermined, elevated, and carried over into the primary defect.
Debridement Text: The wound edges were debrided prior to proceeding with the closure to facilitate wound healing.
O-T Advancement Flap Text: An O-T rotation advancement flap was designed.  The flap was incised to the underlying adipose tissue.  The flap was then undermined, elevated, and carried over the primary defect and sutured into place.  The flap was rotated, advanced, elevated, and carried into the primary defect, creating a secondary defect.  Raised tissue cones were removed as necessary by standard technique.  The superficial and subcutaneous layers were closed with buried vertical mattress sutures.  The epidermis was then approximated with simple interrupted and running sutures. Careful attention was given to eversion and even approximation of the wound edges.
Bcc Keratotic Histology Text: There were aggregates of basaloid cells demonstrating a pink keratotic pattern.
Advancement Flap (Double) Text: The tissue surrounding the operative site was undermined extensively with blunt scissor dissection.  A bilateral advancement flap was designed.  The flaps were incised to the underlying adipose tissue.  The flaps were then undermined, elevated, and carried over the primary defect and sutured into place.  The flaps were then advanced into the primary defect.  Raised tissue cones were removed as necessary by standard technique.  The superficial fascia and subcutaneous layers were closed with buried vertical mattress sutures.  The epidermis was then approximated with simple interrupted and running sutures.  Careful attention was given to eversion and even approximation of the wound edges.
Repair Anesthesia Method: local infiltration
Eyelid Partial Thickness Repair - 48521: The eyelid defect was partial thickness which required a wedge repair of the eyelid. Special care was taken to ensure that the eyelid margin was realligned when placing sutures.
Zygomaticofacial Flap Text: Given the location of the defect, shape of the defect and the proximity to free margins a zygomaticofacial flap was deemed most appropriate for repair.  Using a sterile surgical marker, the appropriate flap was drawn incorporating the defect and placing the expected incisions within the relaxed skin tension lines where possible. The area thus outlined was incised deep to adipose tissue with a #15 scalpel blade with preservation of a vascular pedicle.  The skin margins were undermined to an appropriate distance in all directions utilizing iris scissors.  The flap was then placed into the defect and anchored with interrupted buried subcutaneous sutures.
Island Pedicle Flap Text: An island pedicle flap was designed in a V-Y advancement.  The flap was incised to the underlying adipose tissue, and the tissue surrounding the operative site was undermined extensively with blunt scissor dissection.  The flap was also undermined in the subcutaneous fat at each pole to increase mobility leaving the center based on a single pedicle.  The island pedicle flap carried over the primary defect and was secured into place with deep sutures.  The superficial fascia and subcutaneous layers were closed with buried vertical mattress sutures.  The epidermis was then approximated with simple interrupted and running sutures.  Careful attention was given to eversion and even approximation of the edges.
Depth Of Tumor Invasion (For Histology): tumor not visualized
Helical Rim Advancement Flap Text: A helical advancement flap was designed. The tissue surrounding the operative site was undermined extensively with blunt scissor dissection.  A single incision along the helical sulcus into the earlobe interiorly was made to the underlying adipose tissue.  The flap was then incised and undermined along the cartilage in the subcutaneous fat and elevated.  The flap was then carried over and advanced superiorly into the primary defect. Raised tissue cones were removed as necessary by standard technique on the posterior pinna and anterior earlobe.  The superficial fascia and subcutaneous layers were closed with buried vertical mattress sutures.  The epidermis was then approximated with simple interrupted and running sutures. Careful attention was given to eversion and even approximation of the wound edges.
Detail Level: Detailed
M-Plasty Complex Repair Preamble Text (Leave Blank If You Do Not Want): The beveled, asymmetric Mohs defect was debeveled with a scalpel to create vertical tissue edges for proper reapproximation.  The tissue surrounding the operative site was undermined extensively with blunt scissor dissection.  The orientation of the closure was placed within the relaxed skin tension lines.  An M-Plasty was designed to reduce the length of the surgical wound to prevent anatomic distortion.  The superficial fascia and subcutaneous layers were first closed using buried vertical mattress sutures.  The epidermis was then approximated with simple interrupted and running sutures. A three-point tip stitch was utilized at the M-Plasty site.  Careful attention was given to eversion and even approximation of the edges.  Standing tissue cones were removed as necessary by standard technique.  A pressure dressing was applied.
Suturegard Body: The suture ends were repeatedly re-tightened and re-clamped to achieve the desired tissue expansion.
Island Pedicle Flap With Canthal Suspension Text: The defect edges were debeveled with a #15 scalpel blade.  Given the location of the defect, shape of the defect and the proximity to free margins an island pedicle advancement flap was deemed most appropriate.  Using a sterile surgical marker, an appropriate advancement flap was drawn incorporating the defect, outlining the appropriate donor tissue and placing the expected incisions within the relaxed skin tension lines where possible. The area thus outlined was incised deep to adipose tissue with a #15 scalpel blade.  The skin margins were undermined to an appropriate distance in all directions around the primary defect and laterally outward around the island pedicle utilizing iris scissors.  There was minimal undermining beneath the pedicle flap. A suspension suture was placed in the canthal tendon to prevent tension and prevent ectropion.
Transposition Flap Text: A transposition flap was delineated in the adjacent donor area.  The flap was incised, widely undermined, elevated, and carried over the primary defect, then sutured into place.  Key buried subcutaneous sutures were placed to close the donor area.  The flap was then secured in place with buried vertical mattress sutures and the epidermis was approximated with simple interrupted and running sutures.  The donor area was similarly closed.  All tissue redundancies were repaired in standard fashion.
Simple / Intermediate / Complex Repair - Final Wound Length In Cm: 6.3
O-T Plasty Text: The defect edges were debeveled with a #15 scalpel blade.  Given the location of the defect, shape of the defect and the proximity to free margins an O-T plasty was deemed most appropriate.  Using a sterile surgical marker, an appropriate O-T plasty was drawn incorporating the defect and placing the expected incisions within the relaxed skin tension lines where possible.    The area thus outlined was incised deep to adipose tissue with a #15 scalpel blade.  The skin margins were undermined to an appropriate distance in all directions utilizing iris scissors.
Mixed Superficial And Nodular Bcc Histology Text: There were aggregates of basaloid cells in a superficial and nodular pattern.
Purse String (Intermediate) Text: Given the location of the defect and the characteristics of the surrounding skin a purse string intermediate closure was deemed most appropriate.  Undermining was performed circumferentially around the surgical defect.  A purse string suture was then placed and tightened.
Stage 1: Number Of Blocks?: 5
Which Eyelid Repair Cpt Are You Using?: 75167
Tumor Depth: Less than 6mm from granular layer and no invasion beyond the subcutaneous fat
Mauc Instructions: By selecting yes to the question below the MAUC number will be added into the note.  This will be calculated automatically based on the diagnosis chosen, the size entered, the body zone selected (H,M,L) and the specific indications you chose. You will also have the option to override the Mohs AUC if you disagree with the automatically calculated number and this option is found in the Case Summary tab.
X Size Of Lesion In Cm (Optional): 1.6
Cartilage Graft Text: A scalpel aluminum foil packet is folded, molded and trimmed to serve as an exact template for the ear cartilage strut required for nasal support prior to flap or graft coverage.  The most precisely matching concave surface is chosen, outlined, prepped and draped in the standard sterile fashion. A semicircular incision is made in the ear skin and a flap elevated to expose the underlying cartilage, which is outlined with the template, incised and removed atraumatically and placed in sterile saline.   Hemostasis with electrocoagulation.  The flap is replaced and repaired with simple interrupted, running and basting sutures with sutures.  A pressure dressing is applied.  The cartilage graft is trimmed to precisely fit the defect, with 2-3 mm of additional length on each end.  The borders of the defect are incised to create pockets into which the cartilage graft is inserted to stabilize it.  The graft is secured to the base of the defect with sutures.
Repair Type: Intermediate Layered Repair
Interpolation Flap Text: A decision was made to reconstruct the defect utilizing an interpolation axial flap and a staged reconstruction.  A telfa template was made of the defect.  This telfa template was then used to outline the interpolation flap.  The donor area for the pedicle flap was then injected with anesthesia.  The flap was excised through the skin and subcutaneous tissue down to the layer of the underlying musculature.  The interpolation flap was carefully excised within this deep plane to maintain its blood supply.  The edges of the donor site were undermined.   The donor site was closed in a primary fashion.  The pedicle was then rotated into position and sutured.  Once the tube was sutured into place, adequate blood supply was confirmed with blanching and refill.  The pedicle was then wrapped with xeroform gauze and dressed appropriately with a telfa and gauze bandage to ensure continued blood supply and protect the attached pedicle.
Mart-1 - Positive Histology Text: MART-1 staining demonstrates areas of higher density and clustering of melanocytes with Pagetoid spread upwards within the epidermis. The surgical margins are positive for tumor cells.
Cheiloplasty (Less Than 50%) Text: A decision was made to reconstruct the defect with a  cheiloplasty.  The defect was undermined extensively.  Additional orbicularis oris muscle was excised with a 15 blade scalpel.  The defect was converted into a full thickness wedge, of less than 50% of the vertical height of the lip, to facilite a better cosmetic result.  Small vessels were then tied off with 5-0 monocyrl. The orbicularis oris, superficial fascia, adipose and dermis were then reapproximated.  After the deeper layers were approximated the epidermis was reapproximated with particular care given to realign the vermilion border.
Consent Type: Consent 1 (Standard)
Retention Suture Bite Size: 3 mm
M-Plasty Intermediate Repair Preamble Text (Leave Blank If You Do Not Want): The beveled, asymmetric Mohs defect was debeveled with a scalpel to create vertical tissue edges for proper reapproximation.  The tissue surrounding the operative site was undermined with blunt scissor dissection.  The orientation of the closure was placed within the relaxed skin tension lines.  An M-Plasty was designed to reduce the length of the surgical wound to prevent anatomic distortion.  The superficial fascia and subcutaneous layers were first closed using buried vertical mattress sutures.  The epidermis was then approximated with simple interrupted and running sutures. A three-point tip stitch was utilized at the M-Plasty site.  Careful attention was given to eversion and even approximation of the edges.  Standing tissue cones were removed as necessary by standard technique.  A pressure dressing was applied.
Medical Necessity Statement: Based on my medical judgement, Mohs surgery is the most appropriate treatment for this cancer compared to other treatments.
Bcc  Morpheaform/Sclerosing Histology Text: There were numerous aggregates of basaloid cells demonstrating an infiltrative pattern in a background of sclerotic stroma
Double O-Z Flap Text: An double O-Z rotation flap was designed.  The flap was incised to the underlying adipose tissue.  The flap was then undermined, elevated, and carried over the primary defect and sutured into place and elevated, rotated, and carried over into the primary defect, creating a secondary defect.  Raised tissue cones were removed as necessary by standard technique. The superficial fascia and subcutaneous layers were closed with buried vertical mattress sutures. The epidermis was then approximated with simple interrupted and running sutures.  Careful attention was given to eversion and even approximation of the wound edges.
Consent (Ear)/Introductory Paragraph: The rationale for Mohs was explained to the patient and consent was obtained. The risks, benefits and alternatives to therapy were discussed in detail. Specifically, the risks of ear deformity, infection, scarring, bleeding, prolonged wound healing, incomplete removal, allergy to anesthesia, nerve injury and recurrence were addressed. Prior to the procedure, the treatment site was clearly identified and confirmed by the patient. All components of Universal Protocol/PAUSE Rule completed.
Scc Ka Subtype Histology Text: There were aggregates of glassy squamous cells consistent with keratoacanthoma.
Area M Indication Text: Tumors in this location are included in Area M (cheek, forehead, scalp, neck, jawline and pretibial skin).  Mohs surgery is indicated for tumors in these anatomic locations.
Advancement-Rotation Flap Text: The defect edges were debeveled with a #15 scalpel blade.  Given the location of the defect, shape of the defect and the proximity to free margins an advancement-rotation flap was deemed most appropriate.  Using a sterile surgical marker, an appropriate flap was drawn incorporating the defect and placing the expected incisions within the relaxed skin tension lines where possible. The area thus outlined was incised deep to adipose tissue with a #15 scalpel blade.  The skin margins were undermined to an appropriate distance in all directions utilizing iris scissors and carried over to close the primary defect.
Bcc Infiltrative Histology Text: There were numerous aggregates of basaloid cells demonstrating an infiltrative pattern.
Spiral Flap Text: The defect edges were debeveled with a #15 scalpel blade.  Given the location of the defect, shape of the defect and the proximity to free margins a spiral flap was deemed most appropriate.  Using a sterile surgical marker, an appropriate rotation flap was drawn incorporating the defect and placing the expected incisions within the relaxed skin tension lines where possible. The area thus outlined was incised deep to adipose tissue with a #15 scalpel blade.  The skin margins were undermined to an appropriate distance in all directions utilizing iris scissors.
Previous Accession (Optional): [id number]
Complex Repair And Flap Additional Text (Will Appearing After The Standard Complex Repair Text): The complex repair was not sufficient to completely close the primary defect. The remaining additional defect was repaired with the flap mentioned below.
Z Plasty Text: To lengthen the scar defect and thereby avoid distortion of the free margin at the pole of the wound, transposition flaps in the form of a z-plasty were designed in the mid portion of opposing wound margins.  These flaps were then incised, elevated, carried over, and transposed using simple interrupted and 3 point tip sutures, thus increasing the overall vertical length of the incision line.
Incidental Squamous Cell Carcinoma In Situ Histology Text: Incidental SCIS was noted at the periphery of the Mohs section.  If clear margins were not obtained, patient was advised to considered 35%TCA followed by topical 5FU once fully healed.
Area L Indication Text: Tumors in this location are included in Area L (trunk and extremities).  Mohs surgery is indicated for larger tumors, or tumors with aggressive histologic features, in these anatomic locations.
Scc Sarcomatoid Histology Text: There were aggregates of squamous cells in a sarcomatous pattern.
Star Wedge Flap Text: The defect edges were debeveled with a #15 scalpel blade.  Given the location of the defect, shape of the defect and the proximity to free margins a star wedge flap was deemed most appropriate.  Using a sterile surgical marker, an appropriate rotation flap was drawn incorporating the defect and placing the expected incisions within the relaxed skin tension lines where possible. The area thus outlined was incised deep to adipose tissue with a #15 scalpel blade.  The skin margins were undermined to an appropriate distance in all directions utilizing iris scissors.
Modified Advancement Flap Text: The defect edges were debeveled with a #15 scalpel blade.  Given the location of the defect, shape of the defect and the proximity to free margins a modified advancement flap was deemed most appropriate.  Using a sterile surgical marker, an appropriate advancement flap was drawn incorporating the defect and placing the expected incisions within the relaxed skin tension lines where possible.    The area thus outlined was incised deep to adipose tissue with a #15 scalpel blade.  The skin margins were undermined to an appropriate distance in all directions utilizing iris scissors and carried over to close the primary defect.
Mucosal Advancement Flap Text: A mucosal advancement flap is designed to utilize the laxity of the inner lower lip labial mucosa.  The defect is widely undermined in the submucosal plane over the orbicularis muscle along the inner lower lip toward the gingival sulcus.  The defect at the vermilion border and laterally along the lower lip was also undermined.  The mucosal flap is elevated, carried over, and advanced anteriorly over the defect. Cutaneous standing deformities are excised in the standard fashion along the lateral lower lip to taper the deformity and to allow for forward advancement of the flap.  The superficial fascia is approximated with tension bearing buried vertical mattress sutures.  The vermilion border is realigned precisely and reapproximated with simple interrupted and running sutures.  Care is given to even approximation and edge eversion.
Mohs Case Number: [id number]
Nasalis-Muscle-Based Myocutaneous Island Pedicle Flap Text: Using a #15 blade, an incision was made around the donor flap to the level of the nasalis muscle. Wide lateral undermining was then performed in both the subcutaneous plane above the nasalis muscle, and in a submuscular plane just above periosteum. This allowed the formation of a free nasalis muscle axial pedicle (based on the angular artery) which was still attached to the actual cutaneous flap, increasing its mobility and vascular viability. Hemostasis was obtained with pinpoint electrocoagulation. The flap was mobilized into position and the pivotal anchor points positioned and stabilized with buried interrupted sutures. Subcutaneous and dermal tissues were closed in a multilayered fashion with sutures. Tissue redundancies were excised, and the epidermal edges were apposed without significant tension and sutured with sutures.
Surgeon: Ahmet Buckley M.D.
Scc Spindle Histology Text: There were aggregates of spindle-like squamous cells.
Tissue Cultured Epidermal Autograft Text: The defect edges were debeveled with a #15 scalpel blade.  Given the location of the defect, shape of the defect and the proximity to free margins a tissue cultured epidermal autograft was deemed most appropriate.  The graft was then trimmed to fit the size of the defect.  The graft was then placed in the primary defect and oriented appropriately.
Purse String (Simple) Text: Given the location of the defect and the characteristics of the surrounding skin a purse string closure was deemed most appropriate.  Undermining was performed circumferentially around the surgical defect.  A purse string suture was then placed and tightened.
Length To Time In Minutes Device Was In Place: 10
Bcc Macronodular Histology Text: There were large aggregates of basaloid cells.
Ear Star Wedge Flap Text: The defect edges were debeveled with a #15 blade scalpel.  Given the location of the defect and the proximity to free margins (helical rim) an ear star wedge flap was deemed most appropriate.  Using a sterile surgical marker, the appropriate flap was drawn incorporating the defect and placing the expected incisions between the helical rim and antihelix where possible.  The area thus outlined was incised through and through with a #15 scalpel blade.
Abbe Flap (Upper To Lower Lip) Text: The defect of the lower lip was assessed and measured.  Given the location and size of the defect, an Abbe flap was deemed most appropriate.  Using a sterile surgical marker, an appropriate Abbe flap was measured and drawn on the upper lip. Local anesthesia was then infiltrated.  A scalpel was then used to incise the upper lip through and through the skin, vermilion, muscle and mucosa, leaving the flap pedicled on the opposite side.  The flap was then rotated and transferred to the lower lip defect.  The flap was then sutured into place with a three layer technique, closing the orbicularis oris muscle layer with subcutaneous buried sutures, followed by a mucosal layer and an epidermal layer.
Date Of Previous Biopsy (Optional): 1/17/2024
Scc In Situ Histology Text: There was full thickness epidermal squamous cell atypia and proliferation in a SCCIS pattern with overlying hypogranulosis and parakeratosis.
Intermediate Repair Preamble Text (Leave Blank If You Do Not Want): The tissue surrounding the operative site was undermined with blunt scissor dissection.  The orientation of the closure was placed within the relaxed skin tension lines.  The superficial fascia and subcutaneous layers were first closed with buried vertical mattress sutures to reduce tension on the wound edges.  The epidermis was then approximated with simple interrupted and running sutures.  Careful attention was given to eversion and even approximation of the edges.  Standing tissue cones were removed as necessary by standard technique.
Home Suture Removal Text: Patient was provided instructions on removing sutures and will remove their sutures at home.  If they have any questions or difficulties they will call the office.
Mustarde Flap Text: The defect edges were debeveled with a #15 scalpel blade.  Given the size, depth and location of the defect and the proximity to free margins a Mustarde flap was deemed most appropriate.  Using a sterile surgical marker, an appropriate flap was drawn incorporating the defect. The area thus outlined was incised with a #15 scalpel blade.  The skin margins were undermined to an appropriate distance in all directions utilizing iris scissors.
Special Stains Used Stage 1: Mart-1
Vermilion Border Text: The closure involved the vermilion border.
Pain Refusal Text: I offered to prescribe pain medication but the patient refused to take this medication.
Follicular Atypia Histology Text: Follicular atypia was noted and reviewed with patient, patient was advised to monitor and report any skin changes.
Deep Sutures: 5-0 Monocryl
Paramedian Forehead Flap Text: A paramedian forehead flap was designed.  An exact template was made of the defect using gentian violet and a non-adherent dressing.  The template was then flattened and traced on the ipsilateral forehead at the hairline with the base of the pedicle at the area of the supratrochlear artery just lateral to the glabellar frown crease.  The pedicle was designed with a base width of 1.0 - 1.5 cm.  The arc of rotation was then checked with a gauze sponge.  The flap was incised to the subcutis distally and then to the galea proximally and elevated to the glabella.  The tissue on the forehead adjacent to the donor site was undermined extensively with blunt scissor dissection in the subgaleal plane.  The flap was then defatted distally using blunt scissor dissection.  The secondary defect was then closed by complex layered closure.  The superficial fascia and subcutaneous layers were closed with buried vertical mattress sutures.  The epidermis was approximated with simple interrupted and running sutures.  Careful attention was given to eversion and even approximation of the wound edges.  The distal flap was thinned of deeper fat lobules preserving a thin layer of fat and the dermal plexus vasculature.  The nasal recipient site was bluntly undermined circumferentially.  The flap was then elevated, transferred, and carried over to the nose and sewn into place in a layered fashion with burried vertical mattress, interrupted and running sutures. The base and pedicle of the flapped were wrapped in Vaseline impregnated gauze.  The pedicle will be divided in 3-4 weeks.
Bcc Adenoid Histology Text: There were aggregates of basaloid cells demonstrating an adenoid or cribiform pattern.
Alar Island Pedicle Flap Text: The defect edges were debeveled with a #15 scalpel blade.  Given the location of the defect, shape of the defect and the proximity to the alar rim an island pedicle advancement flap was deemed most appropriate.  Using a sterile surgical marker, an appropriate advancement flap was drawn incorporating the defect, outlining the appropriate donor tissue and placing the expected incisions within the nasal ala running parallel to the alar rim. The area thus outlined was incised with a #15 scalpel blade.  The skin margins were undermined minimally to an appropriate distance in all directions around the primary defect and laterally outward around the island pedicle utilizing iris scissors.  There was minimal undermining beneath the pedicle flap.
Double Z Plasty Text: To lengthen the scar defect and thereby avoid distortion of the free margin at the pole of the wound, transposition flaps in the form of a double z-plasty were designed in the mid portion of opposing wound margins.  These flaps were then incised, elevated, carried over, and transposed using simple interrupted and 3 point tip sutures, thus increasing the overall vertical length of the incision line.
Hatchet Flap Text: The defect edges were debeveled with a #15 scalpel blade.  Given the location of the defect, shape of the defect and the proximity to free margins a hatchet flap was deemed most appropriate.  Using a sterile surgical marker, an appropriate hatchet flap was drawn incorporating the defect and placing the expected incisions within the relaxed skin tension lines where possible.    The area thus outlined was incised deep to adipose tissue with a #15 scalpel blade.  The skin margins were undermined to an appropriate distance in all directions utilizing iris scissors.
Peng Advancement Flap Text: The defect edges were debeveled with a #15 scalpel blade.  Given the location of the defect, shape of the defect and the proximity to free margins a Peng advancement flap was deemed most appropriate.  Using a sterile surgical marker, an appropriate advancement flap was drawn incorporating the defect and placing the expected incisions within the relaxed skin tension lines where possible. The area thus outlined was incised deep to adipose tissue with a #15 scalpel blade.  The skin margins were undermined to an appropriate distance in all directions utilizing iris scissors and carried over to close the primary defect.
Scc Desmoplastic Subtype Histology Text: There were aggregates of squamous cells in a desmoplastic stroma.
Unna Boot Text: An Unna boot was placed to help immobilize the limb and facilitate more rapid healing.  Patient was provided instructions on removing the Unna boot at home in 1 week
Adjacent Tissue Transfer Text: An adjacent tissue transfer flap was designed.  The tissue surrounding the operative site was undermined extensively with blunt scissors dissection.  The flap was incised to the underlying adipose tissue.  The flap was then undermined, elevated, and carried over the primary defect and sutured into place.  Raised tissue cones were removed as necessary by standard technique.  The superficial fascia and subcutaneous layers were closed with buried vertical mattress sutures.  The epidermis was then approximated with simple interrupted and running sutures.  Careful attention was given to eversion and even approximation of the wound edges.
Staging Info: By selecting yes to the question above you will include information on AJCC 8 tumor staging in your Mohs note. Information on tumor staging will be automatically added for SCCs on the head and neck. AJCC 8 includes tumor size, tumor depth, perineural involvement and bone invasion.
Stage 1 Add-On Histology Text: One or more of the reagents used in the immunohistochemical testing in this case may not have been cleared or approved by the [place] FDA. The FDA has determined that such clearance or approval is not necessary. These tests are used for clinical purposes. They should not be regarded as investigational or for research. These reagents' performance characterstics have been determined by [facility]. This laboratory is certified under the Clinical Laboratory Improvement Amendments of 1988 as qualified to perform high complexity clinical laboratory testing.
Helical Rim Text: The closure involved the helical rim.
Nasalis Myocutaneous Flap Text: Using a #15 blade, an incision was made around the donor flap to the level of the nasalis muscle. Wide lateral undermining was then performed in both the subcutaneous plane above the nasalis muscle, and in a submuscular plane just above periosteum. This allowed the formation of a free nasalis muscle axial pedicle which was still attached to the actual cutaneous flap, increasing its mobility and vascular viability. Hemostasis was obtained with pinpoint electrocoagulation. The flap was mobilized into position and the pivotal anchor points positioned and stabilized with buried interrupted sutures. Subcutaneous and dermal tissues were closed in a multilayered fashion with sutures. Tissue redundancies were excised, and the epidermal edges were apposed without significant tension and sutured with sutures.
Rectangular Flap Text: The defect edges were debeveled with a #15 scalpel blade. Given the location of the defect and the proximity to free margins a rectangular flap was deemed most appropriate. Using a sterile surgical marker, an appropriate rectangular flap was drawn incorporating the defect. The area thus outlined was incised deep to adipose tissue with a #15 scalpel blade. The skin margins were undermined to an appropriate distance in all directions utilizing iris scissors. Following this, the designed flap was carried over into the primary defect and sutured into place.

## 2024-04-04 ENCOUNTER — APPOINTMENT (OUTPATIENT)
Dept: URBAN - METROPOLITAN AREA SURGERY 20 | Age: 69
Setting detail: DERMATOLOGY
End: 2024-04-04

## 2024-04-04 DIAGNOSIS — L90.5 SCAR CONDITIONS AND FIBROSIS OF SKIN: ICD-10-CM

## 2024-04-04 PROCEDURE — OTHER COUNSELING: OTHER

## 2024-04-04 PROCEDURE — 99212 OFFICE O/P EST SF 10 MIN: CPT

## 2024-04-04 ASSESSMENT — LOCATION DETAILED DESCRIPTION DERM: LOCATION DETAILED: RIGHT CENTRAL MANDIBULAR CHEEK

## 2024-04-04 ASSESSMENT — LOCATION SIMPLE DESCRIPTION DERM: LOCATION SIMPLE: RIGHT CHEEK

## 2024-04-04 ASSESSMENT — LOCATION ZONE DERM: LOCATION ZONE: FACE

## 2024-08-08 ENCOUNTER — APPOINTMENT (OUTPATIENT)
Dept: URBAN - METROPOLITAN AREA SURGERY 20 | Age: 69
Setting detail: DERMATOLOGY
End: 2024-08-08

## 2024-08-08 VITALS — TEMPERATURE: 97.7 F | SYSTOLIC BLOOD PRESSURE: 154 MMHG | DIASTOLIC BLOOD PRESSURE: 84 MMHG | HEART RATE: 94 BPM

## 2024-08-08 PROBLEM — C44.319 BASAL CELL CARCINOMA OF SKIN OF OTHER PARTS OF FACE: Status: ACTIVE | Noted: 2024-08-08

## 2024-08-08 PROCEDURE — 17311 MOHS 1 STAGE H/N/HF/G: CPT

## 2024-08-08 PROCEDURE — OTHER MIPS QUALITY: OTHER

## 2024-08-08 PROCEDURE — OTHER MOHS SURGERY: OTHER

## 2024-08-08 PROCEDURE — 12052 INTMD RPR FACE/MM 2.6-5.0 CM: CPT

## 2024-08-08 NOTE — HPI: SKIN LESION (BASAL CELL CARCINOMA)
Is This A New Presentation, Or A Follow-Up?: Basal Cell Carcinoma
Location From Outside Provider (Will Override Previously Chosen Location): Left Cheek
When Was Basal Cell Biopsied? (Optional): 7/12/2024
Accession # (Optional): [id number]

## 2024-08-08 NOTE — PROCEDURE: MOHS SURGERY
Include Suturegard In Note?: No
Dorsal Nasal Flap Text: The defect edges were debeveled with a #15 scalpel blade.  Given the location of the defect and the proximity to free margins a dorsal nasal flap was deemed most appropriate.  Using a sterile surgical marker, an appropriate dorsal nasal flap was drawn around the defect.    The area thus outlined was incised deep to adipose tissue with a #15 scalpel blade.  The skin margins were undermined to an appropriate distance in all directions utilizing iris scissors.
Consent (Spinal Accessory)/Introductory Paragraph: The rationale for Mohs was explained to the patient and consent was obtained. The risks, benefits and alternatives to therapy were discussed in detail. Specifically, the risks of damage to the spinal accessory nerve, infection, scarring, bleeding, prolonged wound healing, incomplete removal, allergy to anesthesia, and recurrence were addressed. Prior to the procedure, the treatment site was clearly identified and confirmed by the patient. All components of Universal Protocol/PAUSE Rule completed.
Abbe Flap (Lower To Upper Lip) Text: The defect of the upper lip was assessed and measured.  Given the location and size of the defect, an Abbe flap was deemed most appropriate.  Using a sterile surgical marker, an appropriate Abbe flap was measured and drawn on the lower lip. Local anesthesia was then infiltrated. A scalpel was then used to incise the upper lip through and through the skin, vermilion, muscle and mucosa, leaving the flap pedicled on the opposite side.  The flap was then rotated and transferred to the lower lip defect.  The flap was then sutured into place with a three layer technique, closing the orbicularis oris muscle layer with subcutaneous buried sutures, followed by a mucosal layer and an epidermal layer.
Scc In Situ Histology Text: There was full thickness epidermal squamous cell atypia and proliferation in a SCCIS pattern with overlying hypogranulosis and parakeratosis.
Localized Dermabrasion With Wire Brush Text: The patient was draped in routine manner.  Localized dermabrasion using 220 grit sterile sandpaper was performed in routine manner to papillary dermis. This spot dermabrasion is being performed to complete skin cancer reconstruction. It also will eliminate the other sun damaged precancerous cells that are known to be part of the regional effect of a lifetime's worth of sun exposure. This localized dermabrasion is therapeutic and should not be considered cosmetic in any regard.
Repair Type: Intermediate Layered Repair
Quadrants Reporting?: 0
Referred To Otolaryngology For Closure Text (Leave Blank If You Do Not Want): After obtaining clear surgical margins the patient was sent to otolaryngology for surgical repair.  The patient understands they will receive post-surgical care and follow-up from the referring physician's office.
Estimated Blood Loss (Cc): minimal
Incidental Squamous Cell Carcinoma In Situ Histology Text: Incidental SCIS was noted at the periphery of the Mohs section.  If clear margins were not obtained, patient was advised to considered 35%TCA followed by topical 5FU once fully healed.
Advancement Flap (Single) Text: An advancement flap was designed.  The tissue surrounding the operative site was undermined extensively with blunt scissors dissection.  The flap was incised to the underlying adipose tissue.  The flap was then undermined, elevated, and carried over the primary defect and sutured into place.  Raised tissue cones were removed as necessary by standard technique.  The superficial fascia and subcutaneous layers were closed with buried vertical mattress sutures.  The epidermis was then approximated with simple interrupted and running sutures.  Careful attention was given to eversion and even approximation of the wound edges.
Scc Moderately Differentiated Histology Text: There were aggregates of moderately-differentiated squamous cells.
Transposition Flap Text: A transposition flap was delineated in the adjacent donor area.  The flap was incised, widely undermined, elevated, and carried over the primary defect, then sutured into place.  Key buried subcutaneous sutures were placed to close the donor area.  The flap was then secured in place with buried vertical mattress sutures and the epidermis was approximated with simple interrupted and running sutures.  The donor area was similarly closed.  All tissue redundancies were repaired in standard fashion.
Special Stains Stage 13 - Results: Base On Clearance Noted Above
Oculoplastic Surgeon Procedure Text (B): After obtaining clear surgical margins the patient was sent to oculoplastics for surgical repair.  The patient understands they will receive post-surgical care and follow-up from the referring physician's office.
Zygomaticofacial Flap Text: Given the location of the defect, shape of the defect and the proximity to free margins a zygomaticofacial flap was deemed most appropriate for repair.  Using a sterile surgical marker, the appropriate flap was drawn incorporating the defect and placing the expected incisions within the relaxed skin tension lines where possible. The area thus outlined was incised deep to adipose tissue with a #15 scalpel blade with preservation of a vascular pedicle.  The skin margins were undermined to an appropriate distance in all directions utilizing iris scissors.  The flap was then placed into the defect and anchored with interrupted buried subcutaneous sutures.
Surgical Defect Length In Cm (Optional): 1.3
Show Assistants Variable: Yes
Rhomboid Transposition Flap Text: A rhombic transposition flap was designed.  The flap was incised to the underlying adipose tissue.  The tissue surrounding the operative site was undermined extensively with blunt scissor dissection.    The flap was then incised, undermined, elevated, defatted, and carried over the primary defect.  The secondary defect was first closed by complex layered closure, moving the flap into the primary defect.  The superficial fascia and subcutaneous layers were closed with buried vertical mattress sutures.  The epidermis was then approximated with simple interrupted and running sutures.  Careful attention was given to eversion and even approximation of the wound edges.
Epidermal Autograft Text: The defect edges were debeveled with a #15 scalpel blade.  Given the location of the defect, shape of the defect and the proximity to free margins an epidermal autograft was deemed most appropriate.  Using a sterile surgical marker, the primary defect shape was transferred to the donor site. The epidermal graft was then harvested.  The skin graft was then placed in the primary defect and oriented appropriately.
Crescentic Intermediate Repair Preamble Text (Leave Blank If You Do Not Want): Undermining was performed with blunt dissection.
Mid-Level Procedure Text (C): After obtaining clear surgical margins the patient was sent to a mid-level provider for surgical repair.  The patient understands they will receive post-surgical care and follow-up from the mid-level provider.
Mixed Superficial And Nodular Bcc Histology Text: There were aggregates of basaloid cells in a superficial and nodular pattern.
Consent 1/Introductory Paragraph: The rationale for Mohs was explained to the patient and consent was obtained. The risks, benefits and alternatives to therapy were discussed in detail. Specifically, the risks of infection, scarring, bleeding, prolonged wound healing, incomplete removal, allergy to anesthesia, nerve injury and recurrence were addressed. Prior to the procedure, the treatment site was clearly identified and confirmed by the patient. All components of Universal Protocol/PAUSE Rule completed.
Retention Suture Bite Size: 3 mm
Keystone Flap Text: The defect edges were debeveled with a #15 scalpel blade.  Given the location of the defect, shape of the defect a keystone flap was deemed most appropriate.  Using a sterile surgical marker, an appropriate keystone flap was drawn incorporating the defect, outlining the appropriate donor tissue and placing the expected incisions within the relaxed skin tension lines where possible. The area thus outlined was incised deep to adipose tissue with a #15 scalpel blade.  The skin margins were undermined to an appropriate distance in all directions around the primary defect and laterally outward around the flap utilizing iris scissors.
Modified Advancement Flap Text: The defect edges were debeveled with a #15 scalpel blade.  Given the location of the defect, shape of the defect and the proximity to free margins a modified advancement flap was deemed most appropriate.  Using a sterile surgical marker, an appropriate advancement flap was drawn incorporating the defect and placing the expected incisions within the relaxed skin tension lines where possible.    The area thus outlined was incised deep to adipose tissue with a #15 scalpel blade.  The skin margins were undermined to an appropriate distance in all directions utilizing iris scissors and carried over to close the primary defect.
Cheiloplasty (Complex) Text: A decision was made to reconstruct the defect with a  cheiloplasty.  The defect was undermined extensively.  Additional orbicularis oris muscle was excised with a 15 blade scalpel.  The defect was converted into a full thickness wedge to facilite a better cosmetic result.  Small vessels were then tied off with 5-0 monocyrl. The orbicularis oris, superficial fascia, adipose and dermis were then reapproximated.  After the deeper layers were approximated the epidermis was reapproximated with particular care given to realign the vermilion border.
Double Island Pedicle Flap Text: The defect edges were debeveled with a #15 scalpel blade.  Given the location of the defect, shape of the defect and the proximity to free margins a double island pedicle advancement flap was deemed most appropriate.  Using a sterile surgical marker, an appropriate advancement flap was drawn incorporating the defect, outlining the appropriate donor tissue and placing the expected incisions within the relaxed skin tension lines where possible.    The area thus outlined was incised deep to adipose tissue with a #15 scalpel blade.  The skin margins were undermined to an appropriate distance in all directions around the primary defect and laterally outward around the island pedicle utilizing iris scissors.  There was minimal undermining beneath the pedicle flap.
Provider Procedure Text (B): After obtaining clear surgical margins the defect was repaired by another provider.
Complex Repair And Flap Additional Text (Will Appearing After The Standard Complex Repair Text): The complex repair was not sufficient to completely close the primary defect. The remaining additional defect was repaired with the flap mentioned below.
Bcc Macronodular Histology Text: There were large aggregates of basaloid cells.
Debridement Text: The wound edges were debrided prior to proceeding with the closure to facilitate wound healing.
Depth Of Tumor Invasion (For Histology): tumor not visualized
Stage 9: Additional Anesthesia Type: 1% lidocaine with epinephrine
Bcc Micronodular Histology Text: There were numerous small/micronodular aggregates of palisaded basaloid cells with peripheral clefting
Complex Repair And Graft Additional Text (Will Appearing After The Standard Complex Repair Text): The complex repair was not sufficient to completely close the primary defect. The remaining additional defect was repaired with the graft mentioned below.
Missing Epidermis Histology Text: Missing tissue was noted on frozen sections and additional slides were cut until present.  If no additional tissue containing epidermis was available, then an additional stage was excised.
A-T Advancement Flap Text: The defect edges were debeveled with a #15 scalpel blade.  Given the location of the defect, shape of the defect and the proximity to free margins an A-T advancement flap was deemed most appropriate.  Using a sterile surgical marker, an appropriate advancement flap was drawn incorporating the defect and placing the expected incisions within the relaxed skin tension lines where possible.    The area thus outlined was incised deep to adipose tissue with a #15 scalpel blade.  The skin margins were undermined to an appropriate distance in all directions utilizing iris scissors and carried over to close the primary defect.
Scc Ka Subtype Histology Text: There were aggregates of glassy squamous cells consistent with keratoacanthoma.
Plastic Surgeon Procedure Text (A): After obtaining clear surgical margins the patient was sent to plastics for surgical repair.  The patient understands they will receive post-surgical care and follow-up from the referring physician's office.
Banner Transposition Flap Text: A transposition flap with a banner configuration was delineated in the adjacent donor area.  The flap was incised, widely undermined, elevated, and carried over the primary defect, then sutured into place.  Key buried subcutaneous sutures were placed to close the donor area.  The flap was then secured in place with buried vertical mattress sutures and the epidermis was approximated with simple interrupted and running sutures.  The donor area was similarly closed.  All tissue redundancies were repaired in standard fashion.
Consent (Lip)/Introductory Paragraph: The rationale for Mohs was explained to the patient and consent was obtained. The risks, benefits and alternatives to therapy were discussed in detail. Specifically, the risks of lip deformity, changes in the oral aperture, infection, scarring, bleeding, prolonged wound healing, incomplete removal, allergy to anesthesia, nerve injury and recurrence were addressed. Prior to the procedure, the treatment site was clearly identified and confirmed by the patient. All components of Universal Protocol/PAUSE Rule completed.
Mastoid Interpolation Flap Text: An area of non-hair-bearing postauricular skin was delineated, with width corresponding the length of the auricular wound.  The flap was then incised and undermined at the level of the underlying fascia.  The ear was pinned back with simple sutures above and/or below the defect to reduce tension on the flap and improve flap reach.  The distal flap was then elevated, carried over the primary defect, and sutured into the ear wound with multiple buried vertical mattress sutures to provide eversion along the helical rim and multiple simple and running epidermal sutures.  A Xeroform gauze dressing was placed behind the flap pedicle.
Asc Procedure Text (D): After obtaining clear surgical margins the patient was sent to an ASC for surgical repair.  The patient understands they will receive post-surgical care and follow-up from the ASC physician.
Purse String (Simple) Text: Given the location of the defect and the characteristics of the surrounding skin a purse string closure was deemed most appropriate.  Undermining was performed circumferentially around the surgical defect.  A purse string suture was then placed and tightened.
Graft Cartilage Fenestration Text: The area was anesthetized to include the underlying cartilage and soft tissue on the opposite surface.  3 mm punch fenestrations were performed through the cartilage to expose the underlying perichondrium and subcutaneous tissue.  The fenestrations improve wound healing by expediting granulation tissue formation and providing a vascular bed for graft nourishment
Area M Indication Text: Tumors in this location are included in Area M (cheek, forehead, scalp, neck, jawline and pretibial skin).  Mohs surgery is indicated for tumors in these anatomic locations.
Crescentic Complex Repair Preamble Text (Leave Blank If You Do Not Want): Extensive wide undermining was performed.
Inflammation Suggestive Of Cancer Camouflage Histology Text: There was a dense lymphocytic infiltrate which prevented adequate histologic evaluation of adjacent structures and camouflaged potential tumor present.
H Plasty Text: Given the location of the defect, shape of the defect and the proximity to free margins a H-plasty was deemed most appropriate for repair.  Using a sterile surgical marker, the appropriate advancement arms of the H-plasty were drawn incorporating the defect and placing the expected incisions within the relaxed skin tension lines where possible. The area thus outlined was incised deep to adipose tissue with a #15 scalpel blade. The skin margins were undermined to an appropriate distance in all directions utilizing iris scissors.  The opposing advancement arms were then advanced into place in opposite direction and anchored with interrupted buried subcutaneous sutures.
Pan-Cytokeratin - Negative Histology Text: CK staining demonstrates a normal density and pattern.
Presence Of Scar Tissue (For Histology): absent
Suturegard Body: The suture ends were repeatedly re-tightened and re-clamped to achieve the desired tissue expansion.
Bilateral Rotation Flap Text: A bilateral rotation flap was designed.  The flap was incised to the underlying adipose tissue.  The tissue surrounding the operative site was undermined extensively with blunt scissor dissection.  The flap was then undermined, elevated, rotated and carried over the primary defect and sutured into place.  Hemostasis was obtained using electrocoagulation.  Raised tissue cones were removed as necessary by standard technique.  The superficial fascia and subcutaneous layers were closed with buried vertical mattress sutures.  The epidermis was then approximated with simple interrupted and running sutures.  Careful attention was given to eversion and even approximation of the wound edges.
Nasalis-Muscle-Based Myocutaneous Island Pedicle Flap Text: Using a #15 blade, an incision was made around the donor flap to the level of the nasalis muscle. Wide lateral undermining was then performed in both the subcutaneous plane above the nasalis muscle, and in a submuscular plane just above periosteum. This allowed the formation of a free nasalis muscle axial pedicle (based on the angular artery) which was still attached to the actual cutaneous flap, increasing its mobility and vascular viability. Hemostasis was obtained with pinpoint electrocoagulation. The flap was mobilized into position and the pivotal anchor points positioned and stabilized with buried interrupted sutures. Subcutaneous and dermal tissues were closed in a multilayered fashion with sutures. Tissue redundancies were excised, and the epidermal edges were apposed without significant tension and sutured with sutures.
Nostril Rim Text: The closure involved the nostril rim.
Pinch Graft Text: The defect edges were debeveled with a #15 scalpel blade. Given the location of the defect, shape of the defect and the proximity to free margins a pinch graft was deemed most appropriate. Using a sterile surgical marker, the primary defect shape was transferred to the donor site. The area thus outlined was incised deep to adipose tissue with a #15 scalpel blade.  The harvested graft was then trimmed of adipose tissue until only dermis and epidermis was left. The skin margins of the secondary defect were undermined to an appropriate distance in all directions utilizing iris scissors.  The secondary defect was closed with interrupted buried subcutaneous sutures.  The skin edges were then re-apposed with running  sutures.  The skin graft was then placed in the primary defect and oriented appropriately.
Eyelid Full Thickness Repair - 04568: The eyelid defect was full thickness which required a wedge repair of the eyelid. Special care was taken to ensure that the eyelid margin was realligned when placing sutures.
Bcc Pigmented Histology Text: There were aggregates of basaloid cells with pigment.
Post-Care Instructions: I reviewed with the patient in detail post-care instructions. Patient is not to engage in any heavy lifting, exercise, or swimming for the next 14 days. Should the patient develop any fevers, chills, bleeding, severe pain patient will contact the office immediately.
Mohs Method Verbiage: An incision at a 45 degree angle following the standard Mohs approach was done and the specimen was harvested as a microscopic controlled layer.
Bcc Adenoid Histology Text: There were aggregates of basaloid cells demonstrating an adenoid or cribiform pattern.
Double O-Z Flap Text: An double O-Z rotation flap was designed.  The flap was incised to the underlying adipose tissue.  The flap was then undermined, elevated, and carried over the primary defect and sutured into place and elevated, rotated, and carried over into the primary defect, creating a secondary defect.  Raised tissue cones were removed as necessary by standard technique. The superficial fascia and subcutaneous layers were closed with buried vertical mattress sutures. The epidermis was then approximated with simple interrupted and running sutures.  Careful attention was given to eversion and even approximation of the wound edges.
Eyelid Partial Thickness Repair - 06177: The eyelid defect was partial thickness which required a wedge repair of the eyelid. Special care was taken to ensure that the eyelid margin was realligned when placing sutures.
Hemigard Intro: Due to skin fragility and wound tension, it was decided to use HEMIGARD adhesive retention suture devices to permit a linear closure. The skin was cleaned and dried for a 6cm distance away from the wound. Excessive hair, if present, was removed to allow for adhesion.
Epidermal Closure: running
Bcc Infiltrative Histology Text: There were numerous aggregates of basaloid cells demonstrating an infiltrative pattern.
Wound Care (No Sutures): Petrolatum
Staging Info: By selecting yes to the question above you will include information on AJCC 8 tumor staging in your Mohs note. Information on tumor staging will be automatically added for SCCs on the head and neck. AJCC 8 includes tumor size, tumor depth, perineural involvement and bone invasion.
Surgeon/Pathologist Verbiage (Will Incorporate Name Of Surgeon From Intro If Not Blank): operated in two distinct and integrated capacities as the surgeon and pathologist.
Epidermal Closure Graft Donor Site (Optional): simple interrupted
V-Y Flap Text: An island pedicle flap was designed in a V-Y advancement.  The flap was incised to the underlying adipose tissue, and the tissue surrounding the operative site was undermined extensively with blunt scissor dissection.  The flap was also undermined in the subcutaneous fat at each pole to increase mobility leaving the center based on a single pedicle.  The island pedicle flap carried over the primary defect and was secured into place with deep sutures.  The superficial fascia and subcutaneous layers were closed with buried vertical mattress sutures.  The epidermis was then approximated with simple interrupted and running sutures.  Careful attention was given to eversion and even approximation of the edges.
Estlander Flap (Upper To Lower Lip) Text: The defect of the lower lip was assessed and measured.  Given the location and size of the defect, an Estlander flap was deemed most appropriate.  Using a sterile surgical marker, an appropriate Estlander flap was measured and drawn on the upper lip. Local anesthesia was then infiltrated. A scalpel was then used to incise the lateral aspect of the flap, through skin, muscle and mucosa, leaving the flap pedicled medially.  The flap was then rotated and positioned to fill the lower lip defect.  The flap was then sutured into place with a three layer technique, closing the orbicularis oris muscle layer with subcutaneous buried sutures, followed by a mucosal layer and an epidermal layer.
Consent (Near Eyelid Margin)/Introductory Paragraph: The rationale for Mohs was explained to the patient and consent was obtained. The risks, benefits and alternatives to therapy were discussed in detail. Specifically, the risks of ectropion or eyelid deformity, infection, scarring, bleeding, prolonged wound healing, incomplete removal, allergy to anesthesia, nerve injury and recurrence were addressed. Prior to the procedure, the treatment site was clearly identified and confirmed by the patient. All components of Universal Protocol/PAUSE Rule completed.
Scc In Situ With Follicular Extension Histology Text: There was full thickness epidermal squamous cell atypia and proliferation in a SCCIS pattern with overlying hypogranulosis, parakeratosis, and follicular or adnexal extension.
Mohs Case Number: [id number]
Tarsorrhaphy Text: A tarsorrhaphy was performed using Frost sutures.
Closure 3 Information: This tab is for additional flaps and grafts above and beyond our usual structured repairs.  Please note if you enter information here it will not currently bill and you will need to add the billing information manually.
Surgeon Performing Repair (Optional): Ahmet Buckley M.D.
Advancement Flap (Double) Text: The tissue surrounding the operative site was undermined extensively with blunt scissor dissection.  A bilateral advancement flap was designed.  The flaps were incised to the underlying adipose tissue.  The flaps were then undermined, elevated, and carried over the primary defect and sutured into place.  The flaps were then advanced into the primary defect.  Raised tissue cones were removed as necessary by standard technique.  The superficial fascia and subcutaneous layers were closed with buried vertical mattress sutures.  The epidermis was then approximated with simple interrupted and running sutures.  Careful attention was given to eversion and even approximation of the wound edges.
Surgical Defect Width In Cm (Optional): 1.5
Scc Poorly Differentiated Histology Text: There were aggregates of poorly-differentiated squamous cells.
Bi-Rhombic Flap Text: A bi-rhombic transposition flap was designed.  The flap was incised to the underlying adipose tissue.  The tissue surrounding the operative site was undermined extensively with blunt scissor dissection.    The flap was then incised, undermined, elevated, defatted, and carried over the primary defect.  The secondary defect was first closed by complex layered closure, moving the flap into the primary defect.  The superficial fascia and subcutaneous layers were closed with buried vertical mattress sutures.  The epidermis was then approximated with simple interrupted and running sutures.  Careful attention was given to eversion and even approximation of the wound edges.
Anesthesia Volume In Cc: 6
Cheek Interpolation Flap Text: An interpolation flap was designed on the adjacent paranasal cheek skin in a triangular island configuration.  The flap was then incised and elevated, and undermined to the pedicle base utilizing perforating arterioles for vascular supply.  The edges were bluntly undermined at the periphery of the donor and recipient beds.  Adipose tissue was trimmed from the distal flap.  The flap was then carried over and sutured into the wound using key buried vertical mattress sutures and multiple Prolene simple interrupted sutures.  The donor defect was closed with multiple buried sutures, and interrupted and running sutures.  Xeroform dressing was placed behind the flap's pedicle.
Why Was The Change Made?: Please Select the Appropriate Response
Dermal Autograft Text: The defect edges were debeveled with a #15 scalpel blade.  Given the location of the defect, shape of the defect and the proximity to free margins a dermal autograft was deemed most appropriate.  Using a sterile surgical marker, the primary defect shape was transferred to the donor site. The area thus outlined was incised deep to adipose tissue with a #15 scalpel blade.  The harvested graft was then trimmed of adipose and epidermal tissue until only dermis was left.  The skin graft was then placed in the primary defect and oriented appropriately.
Consent 2/Introductory Paragraph: Mohs surgery was explained to the patient and consent was obtained. The risks, benefits and alternatives to therapy were discussed in detail. Specifically, the risks of infection, scarring, bleeding, prolonged wound healing, incomplete removal, allergy to anesthesia, nerve injury and recurrence were addressed. Prior to the procedure, the treatment site was clearly identified and confirmed by the patient. All components of Universal Protocol/PAUSE Rule completed.
Mixed Nodular And Infiltrative Bcc Histology Text: There were aggregates of basaloid cells in a nodular and infiltrative pattern.
Postop Diagnosis: same
Tumor Depth: Less than 6mm from granular layer and no invasion beyond the subcutaneous fat
Subsequent Stages Histo Method Verbiage: Using a similar technique to that described above, a thin layer of tissue was removed from all areas where tumor was visible on the previous stage.  The tissue was again oriented, mapped, dyed, and processed as above.
O-T Plasty Text: The defect edges were debeveled with a #15 scalpel blade.  Given the location of the defect, shape of the defect and the proximity to free margins an O-T plasty was deemed most appropriate.  Using a sterile surgical marker, an appropriate O-T plasty was drawn incorporating the defect and placing the expected incisions within the relaxed skin tension lines where possible.    The area thus outlined was incised deep to adipose tissue with a #15 scalpel blade.  The skin margins were undermined to an appropriate distance in all directions utilizing iris scissors.
Mart-1 - Negative Histology Text: MART-1 staining demonstrates a normal density and pattern of melanocytes along the dermal-epidermal junction. The surgical margins are negative for tumor cells.
Mucosal Advancement Flap Text: A mucosal advancement flap is designed to utilize the laxity of the inner lower lip labial mucosa.  The defect is widely undermined in the submucosal plane over the orbicularis muscle along the inner lower lip toward the gingival sulcus.  The defect at the vermilion border and laterally along the lower lip was also undermined.  The mucosal flap is elevated, carried over, and advanced anteriorly over the defect. Cutaneous standing deformities are excised in the standard fashion along the lateral lower lip to taper the deformity and to allow for forward advancement of the flap.  The superficial fascia is approximated with tension bearing buried vertical mattress sutures.  The vermilion border is realigned precisely and reapproximated with simple interrupted and running sutures.  Care is given to even approximation and edge eversion.
Ear Wedge Repair Text: A full helical wedge was designed.  The excision removing the full helical wedge was performed using a #15 blade.  The wound was closed in separate sequential layers (cartilage, skin). The sutures were placed from the apex upward with buried sutures and simple interrupted and running epidermal sutures.  The helical rim was carefully reapproximated with sutures to provide eversion with buried vertical mattress and epidermal mattress interrupted and running sutures for eversion and even approximation.
Island Pedicle Flap-Requiring Vessel Identification Text: The defect edges were debeveled with a #15 scalpel blade.  Given the location of the defect, shape of the defect and the proximity to free margins an island pedicle advancement flap was deemed most appropriate.  Using a sterile surgical marker, an appropriate advancement flap was drawn, based on the axial vessel mentioned above, incorporating the defect, outlining the appropriate donor tissue and placing the expected incisions within the relaxed skin tension lines where possible.    The area thus outlined was incised deep to adipose tissue with a #15 scalpel blade.  The skin margins were undermined to an appropriate distance in all directions around the primary defect and laterally outward around the island pedicle utilizing iris scissors.  There was minimal undermining beneath the pedicle flap.
Anesthesia Volume In Cc: 3
Peng Advancement Flap Text: The defect edges were debeveled with a #15 scalpel blade.  Given the location of the defect, shape of the defect and the proximity to free margins a Peng advancement flap was deemed most appropriate.  Using a sterile surgical marker, an appropriate advancement flap was drawn incorporating the defect and placing the expected incisions within the relaxed skin tension lines where possible. The area thus outlined was incised deep to adipose tissue with a #15 scalpel blade.  The skin margins were undermined to an appropriate distance in all directions utilizing iris scissors and carried over to close the primary defect.
Length To Time In Minutes Device Was In Place: 10
Closure 2 Information: This tab is for additional flaps and grafts, including complex repair and grafts and complex repair and flaps. You can also specify a different location for the additional defect, if the location is the same you do not need to select a new one. We will insert the automated text for the repair you select below just as we do for solitary flaps and grafts. Please note that at this time if you select a location with a different insurance zone you will need to override the ICD10 and CPT if appropriate.
O-Z Plasty Text: The defect edges were debeveled with a #15 scalpel blade.  Given the location of the defect, shape of the defect and the proximity to free margins an O-Z plasty (double transposition flap) was deemed most appropriate.  Using a sterile surgical marker, the appropriate transposition flaps were drawn incorporating the defect and placing the expected incisions within the relaxed skin tension lines where possible.    The area thus outlined was incised deep to adipose tissue with a #15 scalpel blade.  The skin margins were undermined to an appropriate distance in all directions utilizing iris scissors.  Hemostasis was achieved with electrocautery.  The flaps were then transposed into place, one clockwise and the other counterclockwise, and anchored with interrupted buried subcutaneous sutures.
Muscle Hinge Flap Text: The defect edges were debeveled with a #15 scalpel blade.  Given the size, depth and location of the defect and the proximity to free margins a muscle hinge flap was deemed most appropriate.  Using a sterile surgical marker, an appropriate hinge flap was drawn incorporating the defect. The area thus outlined was incised with a #15 scalpel blade.  The skin margins were undermined to an appropriate distance in all directions utilizing iris scissors.
Full Thickness Lip Wedge Repair (Flap) Text: The vermilion border of the lip adjacent to the wound was carefully marked with gentian violet.  A full lip wedge was designed. The excision removing the full lip wedge was performed using a #15 blade.  The labial arteries were isolated and indirectly electrocoagulated.  The wound was closed in separate sequential layers (labial mucosa, orbicularis oris muscle and skin) using buried sutures and mucosal and epidermal simple interrupted, running and locked running sutures.  The sutures were placed from the apex upward. The vermilion border was carefully reapproximated.
Bcc  Morpheaform/Sclerosing Histology Text: There were numerous aggregates of basaloid cells demonstrating an infiltrative pattern in a background of sclerotic stroma
Dressing: pressure dressing with telfa
Scc Desmoplastic Subtype Histology Text: There were aggregates of squamous cells in a desmoplastic stroma.
O-T Advancement Flap Text: An O-T rotation advancement flap was designed.  The flap was incised to the underlying adipose tissue.  The flap was then undermined, elevated, and carried over the primary defect and sutured into place.  The flap was rotated, advanced, elevated, and carried into the primary defect, creating a secondary defect.  Raised tissue cones were removed as necessary by standard technique.  The superficial and subcutaneous layers were closed with buried vertical mattress sutures.  The epidermis was then approximated with simple interrupted and running sutures. Careful attention was given to eversion and even approximation of the wound edges.
Consent 3/Introductory Paragraph: I gave the patient a chance to ask questions they had about the procedure.  Following this I explained the Mohs procedure and consent was obtained. The risks, benefits and alternatives to therapy were discussed in detail. Specifically, the risks of infection, scarring, bleeding, prolonged wound healing, incomplete removal, allergy to anesthesia, nerve injury and recurrence were addressed. Prior to the procedure, the treatment site was clearly identified and confirmed by the patient. All components of Universal Protocol/PAUSE Rule completed.
Pain Refusal Text: I offered to prescribe pain medication but the patient refused to take this medication.
Bilobed Flap Text: A geometric bilobed transposition flap was designed.  The flap was incised to the underlying adipose tissue. The flap was then undermined, elevated, and carried over the primary defect and sutured into place to its pedicle base.  The entire tissue surrounding the operative site was undermined extensively with blunt scissor dissection.  The primary lobe was then defatted using blunt scissor dissection.  The secondary lobe was carried over to cover the secondary defect created by the first lobe of the flap.  The primary lobe of the flap was then carried over the primary defect.  Both lobes of the flap were trimmed to fit the recipient sites as needed.  The superficial fascia and subcutaneous layers were closed with buried vertical mattress sutures.  The epidermis was approximated with simple interrupted and running sutures.  Careful attention was given to eversion and even approximation of the wound edges.
Consent (Scalp)/Introductory Paragraph: The rationale for Mohs was explained to the patient and consent was obtained. The risks, benefits and alternatives to therapy were discussed in detail. Specifically, the risks of changes in hair growth pattern secondary to repair, infection, scarring, bleeding, prolonged wound healing, incomplete removal, allergy to anesthesia, nerve injury and recurrence were addressed. Prior to the procedure, the treatment site was clearly identified and confirmed by the patient. All components of Universal Protocol/PAUSE Rule completed.
Purse String (Intermediate) Text: Given the location of the defect and the characteristics of the surrounding skin a purse string intermediate closure was deemed most appropriate.  Undermining was performed circumferentially around the surgical defect.  A purse string suture was then placed and tightened.
Secondary Intention Text (Leave Blank If You Do Not Want): The defect will heal with secondary intention.
Hemigard Retention Suture: 2-0 Nylon
Area L Indication Text: Tumors in this location are included in Area L (trunk and extremities).  Mohs surgery is indicated for larger tumors, or tumors with aggressive histologic features, in these anatomic locations.
Area Suspicious For Tumor Histology Text: An area suspicious for additional tumor was noted and excised with additional stage(s).
Spiral Flap Text: The defect edges were debeveled with a #15 scalpel blade.  Given the location of the defect, shape of the defect and the proximity to free margins a spiral flap was deemed most appropriate.  Using a sterile surgical marker, an appropriate rotation flap was drawn incorporating the defect and placing the expected incisions within the relaxed skin tension lines where possible. The area thus outlined was incised deep to adipose tissue with a #15 scalpel blade.  The skin margins were undermined to an appropriate distance in all directions utilizing iris scissors.
W Plasty Text: The lesion was extirpated to the level of the fat with a #15 scalpel blade.  Given the location of the defect, shape of the defect and the proximity to free margins a W-plasty was deemed most appropriate for repair.  Using a sterile surgical marker, the appropriate transposition arms of the W-plasty were drawn incorporating the defect and placing the expected incisions within the relaxed skin tension lines where possible.    The area thus outlined was incised deep to adipose tissue with a #15 scalpel blade.  The skin margins were undermined to an appropriate distance in all directions utilizing iris scissors.  The opposing transposition arms were then transposed into place in opposite direction and anchored with interrupted buried subcutaneous sutures.
Orbicularis Oris Muscle Flap Text: The defect edges were debeveled with a #15 scalpel blade.  Given that the defect affected the competency of the oral sphincter an orbicularis oris muscle flap was deemed most appropriate to restore this competency and normal muscle function.  Using a sterile surgical marker, an appropriate flap was drawn incorporating the defect. The area thus outlined was incised with a #15 scalpel blade.
Burow's Graft Text: The tissue surrounding the operative site was extensively undermined.  A raised cone was removed from one end of the defect to serve as a full thickness graft. This area was then closed by complex layered closure using buried vertical mattress sutures to close the superficial fascia and subcutaneous fat, and simple interrupted and running sutures to approximate the epidermis.  The obtained graft was defatted and placed in saline gauze.  The graft was trimmed to fit the defect as needed with blunt scissors.  The graft was then sutured into the recipient site with interrupted and basting sutures.  Careful attention was given to even approximation of the wound edges.
Brow Lift Text: A midfrontal incision was made medially to the defect to allow access to the tissues just superior to the left eyebrow. Following careful dissection inferiorly in a supraperiosteal plane to the level of the left eyebrow, several 3-0 monocryl sutures were used to resuspend the eyebrow orbicularis oculi muscular unit to the superior frontal bone periosteum. This resulted in an appropriate reapproximation of static eyebrow symmetry and correction of the left brow ptosis.
Repair Anesthesia Method: local infiltration
Staged Advancement Flap Text: The defect edges were debeveled with a #15 scalpel blade.  Given the location of the defect, shape of the defect and the proximity to free margins a staged advancement flap was deemed most appropriate.  Using a sterile surgical marker, an appropriate advancement flap was drawn incorporating the defect and placing the expected incisions within the relaxed skin tension lines where possible. The area thus outlined was incised deep to adipose tissue with a #15 scalpel blade.  The skin margins were undermined to an appropriate distance in all directions utilizing iris scissors and carried over to close the primary defect.  That flap was incised, undermined, elevated, and carried over into the primary defect.
Cartilage Graft Text: A scalpel aluminum foil packet is folded, molded and trimmed to serve as an exact template for the ear cartilage strut required for nasal support prior to flap or graft coverage.  The most precisely matching concave surface is chosen, outlined, prepped and draped in the standard sterile fashion. A semicircular incision is made in the ear skin and a flap elevated to expose the underlying cartilage, which is outlined with the template, incised and removed atraumatically and placed in sterile saline.   Hemostasis with electrocoagulation.  The flap is replaced and repaired with simple interrupted, running and basting sutures with sutures.  A pressure dressing is applied.  The cartilage graft is trimmed to precisely fit the defect, with 2-3 mm of additional length on each end.  The borders of the defect are incised to create pockets into which the cartilage graft is inserted to stabilize it.  The graft is secured to the base of the defect with sutures.
Medical Necessity Statement: Based on my medical judgement, Mohs surgery is the most appropriate treatment for this cancer compared to other treatments.
Deep Sutures: 5-0 Monocryl
Where Do You Want The Question To Include Opioid Counseling Located?: Case Summary Tab
Hemigard Postcare Instructions: The HEMIGARD strips are to remain completely dry for at least 5-7 days.
Bcc Superficial Histology Text: There were aggregates of basaloid cells budding from the epidermis.
Mohs Histo Method Verbiage: Each section was then chromacoded and processed in the Mohs lab using the Mohs protocol and submitted for frozen section.
Advancement-Rotation Flap Text: The defect edges were debeveled with a #15 scalpel blade.  Given the location of the defect, shape of the defect and the proximity to free margins an advancement-rotation flap was deemed most appropriate.  Using a sterile surgical marker, an appropriate flap was drawn incorporating the defect and placing the expected incisions within the relaxed skin tension lines where possible. The area thus outlined was incised deep to adipose tissue with a #15 scalpel blade.  The skin margins were undermined to an appropriate distance in all directions utilizing iris scissors and carried over to close the primary defect.
Bcc Infundibulocystic Histology Text: There were aggregates of basaloid cells demonstrating an infundibulocystic pattern.
Consent (Ear)/Introductory Paragraph: The rationale for Mohs was explained to the patient and consent was obtained. The risks, benefits and alternatives to therapy were discussed in detail. Specifically, the risks of ear deformity, infection, scarring, bleeding, prolonged wound healing, incomplete removal, allergy to anesthesia, nerve injury and recurrence were addressed. Prior to the procedure, the treatment site was clearly identified and confirmed by the patient. All components of Universal Protocol/PAUSE Rule completed.
Island Pedicle Flap With Canthal Suspension Text: The defect edges were debeveled with a #15 scalpel blade.  Given the location of the defect, shape of the defect and the proximity to free margins an island pedicle advancement flap was deemed most appropriate.  Using a sterile surgical marker, an appropriate advancement flap was drawn incorporating the defect, outlining the appropriate donor tissue and placing the expected incisions within the relaxed skin tension lines where possible. The area thus outlined was incised deep to adipose tissue with a #15 scalpel blade.  The skin margins were undermined to an appropriate distance in all directions around the primary defect and laterally outward around the island pedicle utilizing iris scissors.  There was minimal undermining beneath the pedicle flap. A suspension suture was placed in the canthal tendon to prevent tension and prevent ectropion.
Which Eyelid Repair Cpt Are You Using?: 20668
Intermediate Repair And Flap Additional Text (Will Appearing After The Standard Complex Repair Text): The intermediate repair was not sufficient to completely close the primary defect. The remaining additional defect was repaired with the flap mentioned below.
Date Of Previous Biopsy (Optional): 7/12/2024
Burow's Advancement Flap Text: The defect edges were debeveled with a #15 scalpel blade.  Given the location of the defect and the proximity to free margins a Burow's advancement flap was deemed most appropriate.  Using a sterile surgical marker, the appropriate advancement flap was drawn incorporating the defect and placing the expected incisions within the relaxed skin tension lines where possible.    The area thus outlined was incised deep to adipose tissue with a #15 scalpel blade.  The skin margins were undermined to an appropriate distance in all directions utilizing iris scissors and carried over to close the primary defect.
Initial Size Of Lesion: 0.5
Scc Acantholytic Histology Text: There were aggregates of squamous cells in an acantholytic pattern.
Helical Rim Advancement Flap Text: A helical advancement flap was designed. The tissue surrounding the operative site was undermined extensively with blunt scissor dissection.  A single incision along the helical sulcus into the earlobe interiorly was made to the underlying adipose tissue.  The flap was then incised and undermined along the cartilage in the subcutaneous fat and elevated.  The flap was then carried over and advanced superiorly into the primary defect. Raised tissue cones were removed as necessary by standard technique on the posterior pinna and anterior earlobe.  The superficial fascia and subcutaneous layers were closed with buried vertical mattress sutures.  The epidermis was then approximated with simple interrupted and running sutures. Careful attention was given to eversion and even approximation of the wound edges.
Repair Hemostasis (Optional): Electrocoagulation
Skin Substitute Text: The defect edges were debeveled with a #15 scalpel blade.  Given the location of the defect, shape of the defect and the proximity to free margins a skin substitute graft was deemed most appropriate.  The graft material was trimmed to fit the size of the defect. The graft was then placed in the primary defect and oriented appropriately.
Information: Selecting Yes will display possible errors in your note based on the variables you have selected. This validation is only offered as a suggestion for you. PLEASE NOTE THAT THE VALIDATION TEXT WILL BE REMOVED WHEN YOU FINALIZE YOUR NOTE. IF YOU WANT TO FAX A PRELIMINARY NOTE YOU WILL NEED TO TOGGLE THIS TO 'NO' IF YOU DO NOT WANT IT IN YOUR FAXED NOTE.
M-Plasty Intermediate Repair Preamble Text (Leave Blank If You Do Not Want): The beveled, asymmetric Mohs defect was debeveled with a scalpel to create vertical tissue edges for proper reapproximation.  The tissue surrounding the operative site was undermined with blunt scissor dissection.  The orientation of the closure was placed within the relaxed skin tension lines.  An M-Plasty was designed to reduce the length of the surgical wound to prevent anatomic distortion.  The superficial fascia and subcutaneous layers were first closed using buried vertical mattress sutures.  The epidermis was then approximated with simple interrupted and running sutures. A three-point tip stitch was utilized at the M-Plasty site.  Careful attention was given to eversion and even approximation of the edges.  Standing tissue cones were removed as necessary by standard technique.  A pressure dressing was applied.
Mohs Rapid Report Verbiage: The area of clinically evident tumor was marked with skin marking ink and appropriately hatched.  The initial incision was made following the Mohs approach through the skin.  The specimen was taken to the lab, divided into the necessary number of pieces, chromacoded and processed according to the Mohs protocol.  This was repeated in successive stages until a tumor free defect was achieved.
Mixed Nodular And Micronodular Bcc Histology Text: There were aggregates of basaloid cells in a nodular and micro nodular pattern.
Wound Check: 28 days
X Size Of Lesion In Cm (Optional): 1.1
Tissue Cultured Epidermal Autograft Text: The defect edges were debeveled with a #15 scalpel blade.  Given the location of the defect, shape of the defect and the proximity to free margins a tissue cultured epidermal autograft was deemed most appropriate.  The graft was then trimmed to fit the size of the defect.  The graft was then placed in the primary defect and oriented appropriately.
Same Histology In Subsequent Stages Text: The pattern and morphology of the tumor is as described in the first stage.
Complex Repair Preamble Text (Leave Blank If You Do Not Want): The beveled, asymmetric Mohs defect was debeveled with a scalpel to create vertical tissue edges for proper reapproximation.  The tissue surrounding the operative site was undermined extensively with blunt scissor dissection to reduce tension and prevent tissue distortion.  The orientation of the closure was placed within the relaxed skin tension lines.  The superficial fascia and subcutaneous layers were then closed using buried vertical mattress sutures.  The epidermis was then approximated with simple interrupted and running sutures. Careful attention was given to eversion and even approximation of the edges.  Standing tissue cones were removed as necessary by standard technique.  A pressure dressing was applied.
Undermining Type: Entire Wound
Double O-Z Plasty Text: The defect edges were debeveled with a #15 scalpel blade.  Given the location of the defect, shape of the defect and the proximity to free margins a Double O-Z plasty (double transposition flap) was deemed most appropriate.  Using a sterile surgical marker, the appropriate transposition flaps were drawn incorporating the defect and placing the expected incisions within the relaxed skin tension lines where possible. The area thus outlined was incised deep to adipose tissue with a #15 scalpel blade.  The skin margins were undermined to an appropriate distance in all directions utilizing iris scissors.  Hemostasis was achieved with electrocautery.  The flaps were then transposed into place, one clockwise and the other counterclockwise, and anchored with interrupted buried subcutaneous sutures.
Mustarde Flap Text: The defect edges were debeveled with a #15 scalpel blade.  Given the size, depth and location of the defect and the proximity to free margins a Mustarde flap was deemed most appropriate.  Using a sterile surgical marker, an appropriate flap was drawn incorporating the defect. The area thus outlined was incised with a #15 scalpel blade.  The skin margins were undermined to an appropriate distance in all directions utilizing iris scissors.
Hatchet Flap Text: The defect edges were debeveled with a #15 scalpel blade.  Given the location of the defect, shape of the defect and the proximity to free margins a hatchet flap was deemed most appropriate.  Using a sterile surgical marker, an appropriate hatchet flap was drawn incorporating the defect and placing the expected incisions within the relaxed skin tension lines where possible.    The area thus outlined was incised deep to adipose tissue with a #15 scalpel blade.  The skin margins were undermined to an appropriate distance in all directions utilizing iris scissors.
Referring Physician (Optional): Roseanna Hardy MD
Ftsg Text: A wound-size template was made using a non-adherent dressing.  The template was then outlined on the donor site with gentian violet.  Local anesthesia was obtained.  The excision at the donor site was carried down to subcutaneous fat.  The harvested graft was placed on a saline gauze.  The donor site was closed by intermediate layered closure. The graft was defatted with blunt scissors.  The graft was then placed on the recipient site and secured at the perimeter.  Careful attention was given to even approximation of the wound edges.  A tie-over bolster dressing with Xeroform gauze and suture was placed over the graft to provide compression.  A pressure dressing was applied to the donor site.
Helical Rim Text: The closure involved the helical rim.
Bcc  Nodular Histology Text: There were numerous large/nodular aggregates of palisaded basaloid cells with peripheral clefting
Scc Sarcomatoid Histology Text: There were aggregates of squamous cells in a sarcomatous pattern.
O-L Flap Text: The defect edges were debeveled with a #15 scalpel blade.  Given the location of the defect, shape of the defect and the proximity to free margins an O-L flap was deemed most appropriate.  Using a sterile surgical marker, an appropriate advancement flap was drawn incorporating the defect and placing the expected incisions within the relaxed skin tension lines where possible.    The area thus outlined was incised deep to adipose tissue with a #15 scalpel blade.  The skin margins were undermined to an appropriate distance in all directions utilizing iris scissors and carried over to close the primary defect.
Unna Boot Text: An Unna boot was placed to help immobilize the limb and facilitate more rapid healing.  Patient was provided instructions on removing the Unna boot at home in 1 week
Detail Level: Detailed
Consent (Temporal Branch)/Introductory Paragraph: The rationale for Mohs was explained to the patient and consent was obtained. The risks, benefits and alternatives to therapy were discussed in detail. Specifically, the risks of damage to the temporal branch of the facial nerve, infection, scarring, bleeding, prolonged wound healing, incomplete removal, allergy to anesthesia, and recurrence were addressed. Prior to the procedure, the treatment site was clearly identified and confirmed by the patient. All components of Universal Protocol/PAUSE Rule completed.
Mauc Instructions: By selecting yes to the question below the MAUC number will be added into the note.  This will be calculated automatically based on the diagnosis chosen, the size entered, the body zone selected (H,M,L) and the specific indications you chose. You will also have the option to override the Mohs AUC if you disagree with the automatically calculated number and this option is found in the Case Summary tab.
Paramedian Forehead Flap Text: A paramedian forehead flap was designed.  An exact template was made of the defect using gentian violet and a non-adherent dressing.  The template was then flattened and traced on the ipsilateral forehead at the hairline with the base of the pedicle at the area of the supratrochlear artery just lateral to the glabellar frown crease.  The pedicle was designed with a base width of 1.0 - 1.5 cm.  The arc of rotation was then checked with a gauze sponge.  The flap was incised to the subcutis distally and then to the galea proximally and elevated to the glabella.  The tissue on the forehead adjacent to the donor site was undermined extensively with blunt scissor dissection in the subgaleal plane.  The flap was then defatted distally using blunt scissor dissection.  The secondary defect was then closed by complex layered closure.  The superficial fascia and subcutaneous layers were closed with buried vertical mattress sutures.  The epidermis was approximated with simple interrupted and running sutures.  Careful attention was given to eversion and even approximation of the wound edges.  The distal flap was thinned of deeper fat lobules preserving a thin layer of fat and the dermal plexus vasculature.  The nasal recipient site was bluntly undermined circumferentially.  The flap was then elevated, transferred, and carried over to the nose and sewn into place in a layered fashion with burried vertical mattress, interrupted and running sutures. The base and pedicle of the flapped were wrapped in Vaseline impregnated gauze.  The pedicle will be divided in 3-4 weeks.
Partial Purse String (Simple) Text: Given the location of the defect and the characteristics of the surrounding skin a simple purse string closure was deemed most appropriate.  Undermining was performed circumferentially around the surgical defect.  A purse string suture was then placed and tightened. Wound tension only allowed a partial closure of the circular defect.
Area H Indication Text: Tumors in this location are included in Area H (eyelids, eyebrows, nose, lips, chin, ear, pre-auricular, post-auricular, temple, genitalia, hands, feet, ankles and areola).  Tissue conservation is critical in these anatomic locations.
Mart-1 - Positive Histology Text: MART-1 staining demonstrates areas of higher density and clustering of melanocytes with Pagetoid spread upwards within the epidermis. The surgical margins are positive for tumor cells.
No Repair - Repaired With Adjacent Surgical Defect Text (Leave Blank If You Do Not Want): After obtaining clear surgical margins the defect was repaired concurrently with the other surgical defect which was in close approximation.
Stage 1: Number Of Blocks?: 1
M-Plasty Complex Repair Preamble Text (Leave Blank If You Do Not Want): The beveled, asymmetric Mohs defect was debeveled with a scalpel to create vertical tissue edges for proper reapproximation.  The tissue surrounding the operative site was undermined extensively with blunt scissor dissection.  The orientation of the closure was placed within the relaxed skin tension lines.  An M-Plasty was designed to reduce the length of the surgical wound to prevent anatomic distortion.  The superficial fascia and subcutaneous layers were first closed using buried vertical mattress sutures.  The epidermis was then approximated with simple interrupted and running sutures. A three-point tip stitch was utilized at the M-Plasty site.  Careful attention was given to eversion and even approximation of the edges.  Standing tissue cones were removed as necessary by standard technique.  A pressure dressing was applied.
Follicular Atypia Histology Text: Follicular atypia was noted and reviewed with patient, patient was advised to monitor and report any skin changes.
Scc Histology Text: There were aggregates of squamous cells.
Tumor Debulked?: dermablade
Melolabial Transposition Flap Text: A nasolabial transposition flap was designed as a single stage procedure.  The incision lines were designed along the nasolabial fold and medial cheek.  Removal of the superior tissue cone was designed by triangulation with the apex pointed toward the medial canthus.  The flap was incised to the underlying adipose tissue.  The flap was then undermined, elevated, and carried over the primary defect and sutured into place.  The tissue surrounding the operative site was undermined extensively with blunt scissor dissection.  On the nose and medial cheek this was performed at the level of the periosteum. Hemostasis was obtained using electrocoagulation.  The flap was then incised, and defatted using blunt scissor dissection.  The cheek was then carried over and advanced medially utilizing pyriform aperture periosteal tacking sutures, carrying over the flap into the primary defect and to recreate the alar crease concavity.  The secondary defect was then closed by complex layered closure. The primary defect wound bed was trimmed as necessary to create a concave surface at the flap was sutured into place.  The superficial fascia and subcutaneous layers were closed with buried vertical mattress sutures.  The epidermis was approximated with simple interrupted and running sutures.  Careful attention was given to eversion and even approximation of the wound edges.  A pressure dressing was applied.
Z Plasty Text: To lengthen the scar defect and thereby avoid distortion of the free margin at the pole of the wound, transposition flaps in the form of a z-plasty were designed in the mid portion of opposing wound margins.  These flaps were then incised, elevated, carried over, and transposed using simple interrupted and 3 point tip sutures, thus increasing the overall vertical length of the incision line.
Incidental Superficial Basal Cell Carcinoma Histology Text: Incidental superficial BCC was noted at the periphery of the Mohs section.  If clear margins were not obtained, patient was advised to considered 35%TCA followed by topical 5FU once fully healed.
Scc Well Differentiated Histology Text: There were aggregates of well-differentiated squamous cells.
Double Z Plasty Text: To lengthen the scar defect and thereby avoid distortion of the free margin at the pole of the wound, transposition flaps in the form of a double z-plasty were designed in the mid portion of opposing wound margins.  These flaps were then incised, elevated, carried over, and transposed using simple interrupted and 3 point tip sutures, thus increasing the overall vertical length of the incision line.
Adjacent Tissue Transfer Text: An adjacent tissue transfer flap was designed.  The tissue surrounding the operative site was undermined extensively with blunt scissors dissection.  The flap was incised to the underlying adipose tissue.  The flap was then undermined, elevated, and carried over the primary defect and sutured into place.  Raised tissue cones were removed as necessary by standard technique.  The superficial fascia and subcutaneous layers were closed with buried vertical mattress sutures.  The epidermis was then approximated with simple interrupted and running sutures.  Careful attention was given to eversion and even approximation of the wound edges.
Anesthesia Type: 1% lidocaine with 1:100,000 epinephrine and 408mcg clindamycin/ml and a 1:10 solution of 8.4% sodium bicarbonate
Star Wedge Flap Text: The defect edges were debeveled with a #15 scalpel blade.  Given the location of the defect, shape of the defect and the proximity to free margins a star wedge flap was deemed most appropriate.  Using a sterile surgical marker, an appropriate rotation flap was drawn incorporating the defect and placing the expected incisions within the relaxed skin tension lines where possible. The area thus outlined was incised deep to adipose tissue with a #15 scalpel blade.  The skin margins were undermined to an appropriate distance in all directions utilizing iris scissors.
Composite Graft Text: A defect size template was made with Telfa and placed on the aspect of the ear, where the convexity and curvature correctly matched the recipient bed.  The graft was excised to include the underlying cartilage and immediately placed in cold saline solution.  Hemostasis with electrocoagulation.  The donor defect was closed with buried vertical mattress sutures followed by simple interrupted sutures.  Standing tissue cones were removed by the standard technique.  Subcutaneous fat was trimmed from the graft.  The composite graft was then placed in the defect and secured with basting sutures.  The edges were precisely approximated with simple interrupted sutures, with care to provide edge eversion.
Retention Suture Text: Retention sutures were placed to support the closure and prevent dehiscence.
Donor Site Anesthesia Type: same as repair anesthesia
Dressing (No Sutures): steri-strips and pressure dressing
Alternatives Discussed Intro (Do Not Add Period): I discussed alternative treatments to Mohs surgery and specifically discussed the risks and benefits of
Bcc Superficial Pigmented Histology Text: There were aggregates of basaloid cells with pigment budding from the epidermis.
Mercedes Flap Text: The tissue surrounding the operative site was undermined extensively with blunt scissor dissection.  A mercedes advancement flap was designed.  The flaps were incised to the underlying adipose tissue.  The flaps were then undermined, elevated, and carried over the primary defect and sutured into place.  The flaps were then advanced into the primary defect.  Raised tissue cones were removed as necessary by standard technique.  The superficial fascia and subcutaneous layers were closed with buried vertical mattress sutures.  The epidermis was then approximated with simple interrupted and running sutures.  Careful attention was given to eversion and even approximation of the wound edges.
Bcc Keratotic Histology Text: There were aggregates of basaloid cells demonstrating a pink keratotic pattern.
Graft Donor Site Bandage (Optional-Leave Blank If You Don't Want In Note): Pressure bandage were applied to the donor site.
Manual Repair Warning Statement: We plan on removing the manually selected variable below in favor of our much easier automatic structured text blocks found in the previous tab. We decided to do this to help make the flow better and give you the full power of structured data. Manual selection is never going to be ideal in our platform and I would encourage you to avoid using manual selection from this point on, especially since I will be sunsetting this feature. It is important that you do one of two things with the customized text below. First, you can save all of the text in a word file so you can have it for future reference. Second, transfer the text to the appropriate area in the Library tab. Lastly, if there is a flap or graft type which we do not have you need to let us know right away so I can add it in before the variable is hidden. No need to panic, we plan to give you roughly 6 months to make the change.
Alar Island Pedicle Flap Text: The defect edges were debeveled with a #15 scalpel blade.  Given the location of the defect, shape of the defect and the proximity to the alar rim an island pedicle advancement flap was deemed most appropriate.  Using a sterile surgical marker, an appropriate advancement flap was drawn incorporating the defect, outlining the appropriate donor tissue and placing the expected incisions within the nasal ala running parallel to the alar rim. The area thus outlined was incised with a #15 scalpel blade.  The skin margins were undermined minimally to an appropriate distance in all directions around the primary defect and laterally outward around the island pedicle utilizing iris scissors.  There was minimal undermining beneath the pedicle flap.
Cheiloplasty (Less Than 50%) Text: A decision was made to reconstruct the defect with a  cheiloplasty.  The defect was undermined extensively.  Additional orbicularis oris muscle was excised with a 15 blade scalpel.  The defect was converted into a full thickness wedge, of less than 50% of the vertical height of the lip, to facilite a better cosmetic result.  Small vessels were then tied off with 5-0 monocyrl. The orbicularis oris, superficial fascia, adipose and dermis were then reapproximated.  After the deeper layers were approximated the epidermis was reapproximated with particular care given to realign the vermilion border.
Consent (Nose)/Introductory Paragraph: The rationale for Mohs was explained to the patient and consent was obtained. The risks, benefits and alternatives to therapy were discussed in detail. Specifically, the risks of nasal deformity, changes in the flow of air through the nose, infection, scarring, bleeding, prolonged wound healing, incomplete removal, allergy to anesthesia, nerve injury and recurrence were addressed. Prior to the procedure, the treatment site was clearly identified and confirmed by the patient. All components of Universal Protocol/PAUSE Rule completed.
Previous Accession (Optional): [id number]
Intermediate Repair And Graft Additional Text (Will Appearing After The Standard Complex Repair Text): The intermediate repair was not sufficient to completely close the primary defect. The remaining additional defect was repaired with the graft mentioned below.
Scc Pigmented Histology Text: There were aggregates of squamous cells with pigment.
Chonodrocutaneous Helical Advancement Flap Text: A helical advancement flap was designed. The tissue surrounding the operative site was undermined extensively with blunt scissor dissection.  A single incision along the helical sulcus into the earlobe interiorly was made to the underlying adipose tissue.  The flap was then undermined along the cartilage in the subcutaneous fat and elevated.  The flap was then carried over and advanced superiorly into the primary defect. Raised tissue cones were removed as necessary by standard technique on the posterior pinna and anterior earlobe.  The superficial fascia and subcutaneous layers were closed with buried vertical mattress sutures.  The epidermis was then approximated with simple interrupted and running sutures. Careful attention was given to eversion and even approximation of the wound edges.
Incidental Actinic Keratosis Histology Text: Incidental AK was noted at the periphery of the Mohs section destruction was performed, pt was was advised to monitor, and/or patient was advised to considered topical 5FU once fully healed.
Interpolation Flap Text: A decision was made to reconstruct the defect utilizing an interpolation axial flap and a staged reconstruction.  A telfa template was made of the defect.  This telfa template was then used to outline the interpolation flap.  The donor area for the pedicle flap was then injected with anesthesia.  The flap was excised through the skin and subcutaneous tissue down to the layer of the underlying musculature.  The interpolation flap was carefully excised within this deep plane to maintain its blood supply.  The edges of the donor site were undermined.   The donor site was closed in a primary fashion.  The pedicle was then rotated into position and sutured.  Once the tube was sutured into place, adequate blood supply was confirmed with blanching and refill.  The pedicle was then wrapped with xeroform gauze and dressed appropriately with a telfa and gauze bandage to ensure continued blood supply and protect the attached pedicle.
Bilateral Helical Rim Advancement Flap Text: A bilateral helical advancement flap was designed. The tissue surrounding the operative site was undermined extensively with blunt scissor dissection.  A single incision along the helical sulcus into the earlobe interiorly was made to the underlying adipose tissue.  The flap was then incised and undermined along the cartilage in the subcutaneous fat and elevated.  The flap was then carried over and advanced superiorly into the primary defect. Raised tissue cones were removed as necessary by standard technique on the posterior pinna and anterior earlobe.  The superficial fascia and subcutaneous layers were closed with buried vertical mattress sutures.  The epidermis was then approximated with simple interrupted and running sutures. Careful attention was given to eversion and even approximation of the wound edges.
Epidermal Sutures: 6-0 Fast Absorbing Gut
Crescentic Advancement Flap Text: The defect edges were debeveled with a #15 scalpel blade.  Given the location of the defect and the proximity to free margins a crescentic advancement flap was deemed most appropriate.  Using a sterile surgical marker, the appropriate advancement flap was drawn incorporating the defect and placing the expected incisions within the relaxed skin tension lines where possible.    The area thus outlined was incised deep to adipose tissue with a #15 scalpel blade.  The skin margins were undermined to an appropriate distance in all directions utilizing iris scissors and carried over to close the primary defect.
Ear Star Wedge Flap Text: The defect edges were debeveled with a #15 blade scalpel.  Given the location of the defect and the proximity to free margins (helical rim) an ear star wedge flap was deemed most appropriate.  Using a sterile surgical marker, the appropriate flap was drawn incorporating the defect and placing the expected incisions between the helical rim and antihelix where possible.  The area thus outlined was incised through and through with a #15 scalpel blade.
Xenograft Text: The defect edges were debeveled with a #15 scalpel blade.  Given the location of the defect, shape of the defect and the proximity to free margins a xenograft was deemed most appropriate.  The graft was then trimmed to fit the size of the defect.  The graft was then placed in the primary defect and oriented appropriately.
Non-Graft Cartilage Fenestration Text: The area was anesthetized to include the underlying cartilage and soft tissue on the opposite surface.  3 mm punch fenestrations were performed through the cartilage to expose the underlying perichondrium and subcutaneous tissue.  The fenestrations improve wound healing by expediting granulation tissue formation and re-epithelization in the bare cartilage defect base.
No Residual Tumor Seen Histology Text: There were no malignant cells seen in the sections examined.
Intermediate Repair Preamble Text (Leave Blank If You Do Not Want): The tissue surrounding the operative site was undermined with blunt scissor dissection.  The orientation of the closure was placed within the relaxed skin tension lines.  The superficial fascia and subcutaneous layers were first closed with buried vertical mattress sutures to reduce tension on the wound edges.  The epidermis was then approximated with simple interrupted and running sutures.  Careful attention was given to eversion and even approximation of the edges.  Standing tissue cones were removed as necessary by standard technique.
Suturegard Intro: Intraoperative tissue expansion was performed, utilizing the SUTUREGARD device, in order to reduce wound tension.
Nasal Turnover Hinge Flap Text: The defect edges were debeveled with a #15 scalpel blade.  Given the size, depth, location of the defect and the defect being full thickness a nasal turnover hinge flap was deemed most appropriate.  Using a sterile surgical marker, an appropriate hinge flap was drawn incorporating the defect. The area thus outlined was incised with a #15 scalpel blade. The flap was designed to recreate the nasal mucosal lining and the alar rim. The skin margins were undermined to an appropriate distance in all directions utilizing iris scissors.
V-Y Plasty Text: The defect edges were debeveled with a #15 scalpel blade.  Given the location of the defect, shape of the defect and the proximity to free margins an V-Y advancement flap was deemed most appropriate.  Using a sterile surgical marker, an appropriate advancement flap was drawn incorporating the defect and placing the expected incisions within the relaxed skin tension lines where possible.    The area thus outlined was incised deep to adipose tissue with a #15 scalpel blade.  The skin margins were undermined to an appropriate distance in all directions utilizing iris scissors.
Rotation Flap Text: A rotation flap was designed.  The flap was incised to the underlying adipose tissue.  The tissue surrounding the operative site was undermined extensively with blunt scissor dissection.  The flap was then undermined, elevated, rotated and carried over the primary defect and sutured into place.  Hemostasis was obtained using electrocoagulation.  Raised tissue cones were removed as necessary by standard technique.  The superficial fascia and subcutaneous layers were closed with buried vertical mattress sutures.  The epidermis was then approximated with simple interrupted and running sutures.  Careful attention was given to eversion and even approximation of the wound edges.
Split-Thickness Skin Graft Text: The defect edges were debeveled with a #15 scalpel blade.  Given the location of the defect, shape of the defect and the proximity to free margins a split thickness skin graft was deemed most appropriate.  Using a sterile surgical marker, the primary defect shape was transferred to the donor site. The split thickness graft was then harvested.  The skin graft was then placed in the primary defect and oriented appropriately.
Consent Type: Consent 1 (Standard)
Vermilion Border Text: The closure involved the vermilion border.
Bcc  Nodulocystic Histology Text: There were aggregates of basaloid cells.
Eye Protection Verbiage: Before proceeding with the stage, a plastic scleral shield was inserted. The globe was anesthetized with a few drops of 1% lidocaine with 1:100,000 epinephrine. Then, an appropriate sized scleral shield was chosen and coated with lacrilube ointment. The shield was gently inserted and left in place for the duration of each stage. After the stage was completed, the shield was gently removed.
Scc Spindle Histology Text: There were aggregates of spindle-like squamous cells.
O-Z Flap Text: An O-Z rotation flap was designed.  The flap was incised to the underlying adipose tissue.  The flap was then undermined, elevated, and carried over the primary defect and sutured into place and elevated, rotated, and carried over into the primary defect, creating a secondary defect.  Raised tissue cones were removed as necessary by standard technique. The superficial fascia and subcutaneous layers were closed with buried vertical mattress sutures. The epidermis was then approximated with simple interrupted and running sutures.  Careful attention was given to eversion and even approximation of the wound edges.
Trilobed Flap Text: A geometric trilobed transposition flap was designed utilizing 45 degree transposition of each flap lobe to direct tension vectors horizontally along the nasal sidewall to prevent distortion of the alar rim.  The flap was incised to the underlying adipose tissue. The flap was then undermined, elevated, and carried over the primary defect and sutured into place to its pedicle base.  The entire tissue surrounding the operative site was undermined extensively with blunt scissor dissection.  The elliptical tertiary donor area was first closed.  The tertiary lobe was transposed, and carried over to cover the tertiary defect created by the second lobe.  The secondary lobe was transposed, and carried over to cover the secondary defect created by the first lobe of the flap.  The primary lobe of the flap was then transposed, and carried over over the primary defect.  All lobes of the flap were trimmed to fit the recipient sites as needed.  The superficial fascia and subcutaneous layers were closed with buried vertical mattress sutures.  The epidermis was approximated with simple interrupted and running sutures.  Careful attention was given to eversion and even approximation of the wound edges.
Abbe Flap (Upper To Lower Lip) Text: The defect of the lower lip was assessed and measured.  Given the location and size of the defect, an Abbe flap was deemed most appropriate.  Using a sterile surgical marker, an appropriate Abbe flap was measured and drawn on the upper lip. Local anesthesia was then infiltrated.  A scalpel was then used to incise the upper lip through and through the skin, vermilion, muscle and mucosa, leaving the flap pedicled on the opposite side.  The flap was then rotated and transferred to the lower lip defect.  The flap was then sutured into place with a three layer technique, closing the orbicularis oris muscle layer with subcutaneous buried sutures, followed by a mucosal layer and an epidermal layer.
Home Suture Removal Text: Patient was provided instructions on removing sutures and will remove their sutures at home.  If they have any questions or difficulties they will call the office.
Consent (Marginal Mandibular)/Introductory Paragraph: The rationale for Mohs was explained to the patient and consent was obtained. The risks, benefits and alternatives to therapy were discussed in detail. Specifically, the risks of damage to the marginal mandibular branch of the facial nerve, infection, scarring, bleeding, prolonged wound healing, incomplete removal, allergy to anesthesia, and recurrence were addressed. Prior to the procedure, the treatment site was clearly identified and confirmed by the patient. All components of Universal Protocol/PAUSE Rule completed.
Partial Purse String (Intermediate) Text: Given the location of the defect and the characteristics of the surrounding skin an intermediate purse string closure was deemed most appropriate.  Undermining was performed circumferentially around the surgical defect.  A purse string suture was then placed and tightened. Wound tension only allowed a partial closure of the circular defect.
Localized Dermabrasion With 15 Blade Text: The patient was draped in routine manner.  Localized dermabrasion using a 15 blade was performed in routine manner to papillary dermis. This spot dermabrasion is being performed to complete skin cancer reconstruction. It also will eliminate the other sun damaged precancerous cells that are known to be part of the regional effect of a lifetime's worth of sun exposure. This localized dermabrasion is therapeutic and should not be considered cosmetic in any regard.
Localized Dermabrasion With Sand Papertext: The patient was draped in routine manner.  Localized dermabrasion using sterile sand paper was performed in routine manner to papillary dermis. This spot dermabrasion is being performed to complete skin cancer reconstruction. It also will eliminate the other sun damaged precancerous cells that are known to be part of the regional effect of a lifetime's worth of sun exposure. This localized dermabrasion is therapeutic and should not be considered cosmetic in any regard.
Flip-Flop Flap Text: The defect edges were debeveled with a #15 blade scalpel.  Given the location of the defect and the proximity to free margins a flip-flop flap was deemed most appropriate. Using a sterile surgical marker, the appropriate flap was drawn incorporating the defect and placing the expected incisions between the helical rim and antihelix where possible.  The area thus outlined was incised through and through with a #15 scalpel blade. Following this, the designed flap was carried over into the primary defect and sutured into place.
Nasalis Myocutaneous Flap Text: Using a #15 blade, an incision was made around the donor flap to the level of the nasalis muscle. Wide lateral undermining was then performed in both the subcutaneous plane above the nasalis muscle, and in a submuscular plane just above periosteum. This allowed the formation of a free nasalis muscle axial pedicle which was still attached to the actual cutaneous flap, increasing its mobility and vascular viability. Hemostasis was obtained with pinpoint electrocoagulation. The flap was mobilized into position and the pivotal anchor points positioned and stabilized with buried interrupted sutures. Subcutaneous and dermal tissues were closed in a multilayered fashion with sutures. Tissue redundancies were excised, and the epidermal edges were apposed without significant tension and sutured with sutures.
Bill 59 Modifier?: No - Continue to Bill 79 Modifier
Nasolabial Transposition Flap Text: The defect edges were debeveled with a #15 scalpel blade.  Given the size, depth and location of the defect and the proximity to free margins a nasolabial transposition flap was deemed most appropriate. Using a sterile surgical marker, an appropriate flap was drawn incorporating the defect. The area thus outlined was incised with a #15 scalpel blade. The skin margins were undermined to an appropriate distance in all directions utilizing iris scissors. Following this, the designed flap was carried into the primary defect and sutured into place.
Rectangular Flap Text: The defect edges were debeveled with a #15 scalpel blade. Given the location of the defect and the proximity to free margins a rectangular flap was deemed most appropriate. Using a sterile surgical marker, an appropriate rectangular flap was drawn incorporating the defect. The area thus outlined was incised deep to adipose tissue with a #15 scalpel blade. The skin margins were undermined to an appropriate distance in all directions utilizing iris scissors. Following this, the designed flap was carried over into the primary defect and sutured into place.
Which Instrument Did You Use For Dermabrasion?: Wire Brush

## 2024-09-12 ENCOUNTER — APPOINTMENT (OUTPATIENT)
Dept: URBAN - METROPOLITAN AREA SURGERY 20 | Age: 69
Setting detail: DERMATOLOGY
End: 2024-09-12

## 2024-09-12 VITALS — DIASTOLIC BLOOD PRESSURE: 94 MMHG | HEART RATE: 69 BPM | SYSTOLIC BLOOD PRESSURE: 163 MMHG | TEMPERATURE: 97.8 F

## 2024-09-12 PROBLEM — C44.41 BASAL CELL CARCINOMA OF SKIN OF SCALP AND NECK: Status: ACTIVE | Noted: 2024-09-12

## 2024-09-12 PROCEDURE — OTHER MIPS QUALITY: OTHER

## 2024-09-12 PROCEDURE — 13121 CMPLX RPR S/A/L 2.6-7.5 CM: CPT

## 2024-09-12 PROCEDURE — OTHER MOHS SURGERY: OTHER

## 2024-09-12 PROCEDURE — 17311 MOHS 1 STAGE H/N/HF/G: CPT

## 2024-09-12 NOTE — HPI: SKIN LESION (BASAL CELL CARCINOMA)
How Severe Is Your Skin Cancer?: mild
Is This A New Presentation, Or A Follow-Up?: Basal Cell Carcinoma
Location From Outside Provider (Will Override Previously Chosen Location): Scalp vertex
When Was Basal Cell Biopsied? (Optional): 8/28/2024
Accession # (Optional): [id number]

## 2024-09-12 NOTE — PROCEDURE: MOHS SURGERY
Epidermal Sutures: 6-0 Fast Absorbing Gut
Show Referring Provider Variable: Yes
Stage 9: Number Of Blocks?: 0
Special Stains Stage 8 - Results: Base On Clearance Noted Above
Direct Brow Lift Performed? (Cpt Must Be Added Manually If Warranted): No
Home Suture Removal Text: Patient was provided instructions on removing sutures and will remove their sutures at home.  If they have any questions or difficulties they will call the office.
Length To Time In Minutes Device Was In Place: 10
Same Histology In Subsequent Stages Text: The pattern and morphology of the tumor is as described in the first stage.
Oculoplastic Surgeon Procedure Text (C): After obtaining clear surgical margins the patient was sent to oculoplastics for surgical repair.  The patient understands they will receive post-surgical care and follow-up from the referring physician's office.
Suturegard Body: The suture ends were repeatedly re-tightened and re-clamped to achieve the desired tissue expansion.
Stage 6: Additional Anesthesia Type: 1% lidocaine with epinephrine
Bilateral Rotation Flap Text: A bilateral rotation flap was designed.  The flap was incised to the underlying adipose tissue.  The tissue surrounding the operative site was undermined extensively with blunt scissor dissection.  The flap was then undermined, elevated, rotated and carried over the primary defect and sutured into place.  Hemostasis was obtained using electrocoagulation.  Raised tissue cones were removed as necessary by standard technique.  The superficial fascia and subcutaneous layers were closed with buried vertical mattress sutures.  The epidermis was then approximated with simple interrupted and running sutures.  Careful attention was given to eversion and even approximation of the wound edges.
Incidental Actinic Keratosis Histology Text: Incidental AK was noted at the periphery of the Mohs section destruction was performed, pt was was advised to monitor, and/or patient was advised to considered topical 5FU once fully healed.
Bcc Keratotic Histology Text: There were aggregates of basaloid cells demonstrating a pink keratotic pattern.
Referred To Plastics For Closure Text (Leave Blank If You Do Not Want): After obtaining clear surgical margins the patient was sent to plastics for surgical repair.  The patient understands they will receive post-surgical care and follow-up from the referring physician's office.
Partial Purse String (Simple) Text: Given the location of the defect and the characteristics of the surrounding skin a simple purse string closure was deemed most appropriate.  Undermining was performed circumferentially around the surgical defect.  A purse string suture was then placed and tightened. Wound tension only allowed a partial closure of the circular defect.
Double O-Z Flap Text: An double O-Z rotation flap was designed.  The flap was incised to the underlying adipose tissue.  The flap was then undermined, elevated, and carried over the primary defect and sutured into place and elevated, rotated, and carried over into the primary defect, creating a secondary defect.  Raised tissue cones were removed as necessary by standard technique. The superficial fascia and subcutaneous layers were closed with buried vertical mattress sutures. The epidermis was then approximated with simple interrupted and running sutures.  Careful attention was given to eversion and even approximation of the wound edges.
Eyelid Full Thickness Repair - 46986: The eyelid defect was full thickness which required a wedge repair of the eyelid. Special care was taken to ensure that the eyelid margin was realligned when placing sutures.
Advancement Flap (Single) Text: An advancement flap was designed.  The tissue surrounding the operative site was undermined extensively with blunt scissors dissection.  The flap was incised to the underlying adipose tissue.  The flap was then undermined, elevated, and carried over the primary defect and sutured into place.  Raised tissue cones were removed as necessary by standard technique.  The superficial fascia and subcutaneous layers were closed with buried vertical mattress sutures.  The epidermis was then approximated with simple interrupted and running sutures.  Careful attention was given to eversion and even approximation of the wound edges.
Pinch Graft Text: The defect edges were debeveled with a #15 scalpel blade. Given the location of the defect, shape of the defect and the proximity to free margins a pinch graft was deemed most appropriate. Using a sterile surgical marker, the primary defect shape was transferred to the donor site. The area thus outlined was incised deep to adipose tissue with a #15 scalpel blade.  The harvested graft was then trimmed of adipose tissue until only dermis and epidermis was left. The skin margins of the secondary defect were undermined to an appropriate distance in all directions utilizing iris scissors.  The secondary defect was closed with interrupted buried subcutaneous sutures.  The skin edges were then re-apposed with running  sutures.  The skin graft was then placed in the primary defect and oriented appropriately.
Double M-Plasty Complex Repair Preamble Text (Leave Blank If You Do Not Want): Extensive wide undermining was performed.
Asc Procedure Text (E): After obtaining clear surgical margins the patient was sent to an ASC for surgical repair.  The patient understands they will receive post-surgical care and follow-up from the ASC physician.
Anesthesia Volume In Cc: 8
Surgical Defect Width In Cm (Optional): 1.3
Zygomaticofacial Flap Text: Given the location of the defect, shape of the defect and the proximity to free margins a zygomaticofacial flap was deemed most appropriate for repair.  Using a sterile surgical marker, the appropriate flap was drawn incorporating the defect and placing the expected incisions within the relaxed skin tension lines where possible. The area thus outlined was incised deep to adipose tissue with a #15 scalpel blade with preservation of a vascular pedicle.  The skin margins were undermined to an appropriate distance in all directions utilizing iris scissors.  The flap was then placed into the defect and anchored with interrupted buried subcutaneous sutures.
Dressing (No Sutures): steri-strips and pressure dressing
Keystone Flap Text: The defect edges were debeveled with a #15 scalpel blade.  Given the location of the defect, shape of the defect a keystone flap was deemed most appropriate.  Using a sterile surgical marker, an appropriate keystone flap was drawn incorporating the defect, outlining the appropriate donor tissue and placing the expected incisions within the relaxed skin tension lines where possible. The area thus outlined was incised deep to adipose tissue with a #15 scalpel blade.  The skin margins were undermined to an appropriate distance in all directions around the primary defect and laterally outward around the flap utilizing iris scissors.
Consent (Nose)/Introductory Paragraph: The rationale for Mohs was explained to the patient and consent was obtained. The risks, benefits and alternatives to therapy were discussed in detail. Specifically, the risks of nasal deformity, changes in the flow of air through the nose, infection, scarring, bleeding, prolonged wound healing, incomplete removal, allergy to anesthesia, nerve injury and recurrence were addressed. Prior to the procedure, the treatment site was clearly identified and confirmed by the patient. All components of Universal Protocol/PAUSE Rule completed.
Abbe Flap (Lower To Upper Lip) Text: The defect of the upper lip was assessed and measured.  Given the location and size of the defect, an Abbe flap was deemed most appropriate.  Using a sterile surgical marker, an appropriate Abbe flap was measured and drawn on the lower lip. Local anesthesia was then infiltrated. A scalpel was then used to incise the upper lip through and through the skin, vermilion, muscle and mucosa, leaving the flap pedicled on the opposite side.  The flap was then rotated and transferred to the lower lip defect.  The flap was then sutured into place with a three layer technique, closing the orbicularis oris muscle layer with subcutaneous buried sutures, followed by a mucosal layer and an epidermal layer.
Alternatives Discussed Intro (Do Not Add Period): I discussed alternative treatments to Mohs surgery and specifically discussed the risks and benefits of
Bilobed Flap Text: A geometric bilobed transposition flap was designed.  The flap was incised to the underlying adipose tissue. The flap was then undermined, elevated, and carried over the primary defect and sutured into place to its pedicle base.  The entire tissue surrounding the operative site was undermined extensively with blunt scissor dissection.  The primary lobe was then defatted using blunt scissor dissection.  The secondary lobe was carried over to cover the secondary defect created by the first lobe of the flap.  The primary lobe of the flap was then carried over the primary defect.  Both lobes of the flap were trimmed to fit the recipient sites as needed.  The superficial fascia and subcutaneous layers were closed with buried vertical mattress sutures.  The epidermis was approximated with simple interrupted and running sutures.  Careful attention was given to eversion and even approximation of the wound edges.
Orbicularis Oris Muscle Flap Text: The defect edges were debeveled with a #15 scalpel blade.  Given that the defect affected the competency of the oral sphincter an orbicularis oris muscle flap was deemed most appropriate to restore this competency and normal muscle function.  Using a sterile surgical marker, an appropriate flap was drawn incorporating the defect. The area thus outlined was incised with a #15 scalpel blade.
Bcc Superficial Histology Text: There were aggregates of basaloid cells budding from the epidermis.
Provider Procedure Text (A): After obtaining clear surgical margins the defect was repaired by another provider.
Distance Of Undermining In Cm (Required): 1.7
Mid-Level Procedure Text (F): After obtaining clear surgical margins the patient was sent to a mid-level provider for surgical repair.  The patient understands they will receive post-surgical care and follow-up from the mid-level provider.
Scc Sarcomatoid Histology Text: There were aggregates of squamous cells in a sarcomatous pattern.
Depth Of Tumor Invasion (For Histology): tumor not visualized
Hemigard Retention Suture: 2-0 Nylon
Scc Histology Text: There were aggregates of squamous cells.
Is There Documentation In The Chart Showing Discussion Of Changes With Another Physician?: Please Select the Appropriate Response
Scc Spindle Histology Text: There were aggregates of spindle-like squamous cells.
Scc Well Differentiated Histology Text: There were aggregates of well-differentiated squamous cells.
Pain Refusal Text: I offered to prescribe pain medication but the patient refused to take this medication.
Wound Care: Petrolatum
Helical Rim Text: The closure involved the helical rim.
Area L Indication Text: Tumors in this location are included in Area L (trunk and extremities).  Mohs surgery is indicated for larger tumors, or tumors with aggressive histologic features, in these anatomic locations.
Intermediate Repair Preamble Text (Leave Blank If You Do Not Want): The tissue surrounding the operative site was undermined with blunt scissor dissection.  The orientation of the closure was placed within the relaxed skin tension lines.  The superficial fascia and subcutaneous layers were first closed with buried vertical mattress sutures to reduce tension on the wound edges.  The epidermis was then approximated with simple interrupted and running sutures.  Careful attention was given to eversion and even approximation of the edges.  Standing tissue cones were removed as necessary by standard technique.
Mohs Case Number: [id number]
Otolaryngologist Procedure Text (F): After obtaining clear surgical margins the patient was sent to otolaryngology for surgical repair.  The patient understands they will receive post-surgical care and follow-up from the referring physician's office.
Hemostasis: Electrocoagulation
Secondary Intention Text (Leave Blank If You Do Not Want): The defect will heal with secondary intention.
Muscle Hinge Flap Text: The defect edges were debeveled with a #15 scalpel blade.  Given the size, depth and location of the defect and the proximity to free margins a muscle hinge flap was deemed most appropriate.  Using a sterile surgical marker, an appropriate hinge flap was drawn incorporating the defect. The area thus outlined was incised with a #15 scalpel blade.  The skin margins were undermined to an appropriate distance in all directions utilizing iris scissors.
Incidental Superficial Basal Cell Carcinoma Histology Text: Incidental superficial BCC was noted at the periphery of the Mohs section.  If clear margins were not obtained, patient was advised to considered 35%TCA followed by topical 5FU once fully healed.
Bcc Histology Text: There were numerous large/nodular aggregates of palisaded basaloid cells with peripheral clefting
Eye Protection Verbiage: Before proceeding with the stage, a plastic scleral shield was inserted. The globe was anesthetized with a few drops of 1% lidocaine with 1:100,000 epinephrine. Then, an appropriate sized scleral shield was chosen and coated with lacrilube ointment. The shield was gently inserted and left in place for the duration of each stage. After the stage was completed, the shield was gently removed.
Dermal Autograft Text: The defect edges were debeveled with a #15 scalpel blade.  Given the location of the defect, shape of the defect and the proximity to free margins a dermal autograft was deemed most appropriate.  Using a sterile surgical marker, the primary defect shape was transferred to the donor site. The area thus outlined was incised deep to adipose tissue with a #15 scalpel blade.  The harvested graft was then trimmed of adipose and epidermal tissue until only dermis was left.  The skin graft was then placed in the primary defect and oriented appropriately.
Closure 3 Information: This tab is for additional flaps and grafts above and beyond our usual structured repairs.  Please note if you enter information here it will not currently bill and you will need to add the billing information manually.
Mucosal Advancement Flap Text: A mucosal advancement flap is designed to utilize the laxity of the inner lower lip labial mucosa.  The defect is widely undermined in the submucosal plane over the orbicularis muscle along the inner lower lip toward the gingival sulcus.  The defect at the vermilion border and laterally along the lower lip was also undermined.  The mucosal flap is elevated, carried over, and advanced anteriorly over the defect. Cutaneous standing deformities are excised in the standard fashion along the lateral lower lip to taper the deformity and to allow for forward advancement of the flap.  The superficial fascia is approximated with tension bearing buried vertical mattress sutures.  The vermilion border is realigned precisely and reapproximated with simple interrupted and running sutures.  Care is given to even approximation and edge eversion.
A-T Advancement Flap Text: The defect edges were debeveled with a #15 scalpel blade.  Given the location of the defect, shape of the defect and the proximity to free margins an A-T advancement flap was deemed most appropriate.  Using a sterile surgical marker, an appropriate advancement flap was drawn incorporating the defect and placing the expected incisions within the relaxed skin tension lines where possible.    The area thus outlined was incised deep to adipose tissue with a #15 scalpel blade.  The skin margins were undermined to an appropriate distance in all directions utilizing iris scissors and carried over to close the primary defect.
Manual Repair Warning Statement: We plan on removing the manually selected variable below in favor of our much easier automatic structured text blocks found in the previous tab. We decided to do this to help make the flow better and give you the full power of structured data. Manual selection is never going to be ideal in our platform and I would encourage you to avoid using manual selection from this point on, especially since I will be sunsetting this feature. It is important that you do one of two things with the customized text below. First, you can save all of the text in a word file so you can have it for future reference. Second, transfer the text to the appropriate area in the Library tab. Lastly, if there is a flap or graft type which we do not have you need to let us know right away so I can add it in before the variable is hidden. No need to panic, we plan to give you roughly 6 months to make the change.
Tissue Cultured Epidermal Autograft Text: The defect edges were debeveled with a #15 scalpel blade.  Given the location of the defect, shape of the defect and the proximity to free margins a tissue cultured epidermal autograft was deemed most appropriate.  The graft was then trimmed to fit the size of the defect.  The graft was then placed in the primary defect and oriented appropriately.
Intermediate Repair And Graft Additional Text (Will Appearing After The Standard Complex Repair Text): The intermediate repair was not sufficient to completely close the primary defect. The remaining additional defect was repaired with the graft mentioned below.
Island Pedicle Flap With Canthal Suspension Text: The defect edges were debeveled with a #15 scalpel blade.  Given the location of the defect, shape of the defect and the proximity to free margins an island pedicle advancement flap was deemed most appropriate.  Using a sterile surgical marker, an appropriate advancement flap was drawn incorporating the defect, outlining the appropriate donor tissue and placing the expected incisions within the relaxed skin tension lines where possible. The area thus outlined was incised deep to adipose tissue with a #15 scalpel blade.  The skin margins were undermined to an appropriate distance in all directions around the primary defect and laterally outward around the island pedicle utilizing iris scissors.  There was minimal undermining beneath the pedicle flap. A suspension suture was placed in the canthal tendon to prevent tension and prevent ectropion.
Consent (Marginal Mandibular)/Introductory Paragraph: The rationale for Mohs was explained to the patient and consent was obtained. The risks, benefits and alternatives to therapy were discussed in detail. Specifically, the risks of damage to the marginal mandibular branch of the facial nerve, infection, scarring, bleeding, prolonged wound healing, incomplete removal, allergy to anesthesia, and recurrence were addressed. Prior to the procedure, the treatment site was clearly identified and confirmed by the patient. All components of Universal Protocol/PAUSE Rule completed.
Helical Rim Advancement Flap Text: A helical advancement flap was designed. The tissue surrounding the operative site was undermined extensively with blunt scissor dissection.  A single incision along the helical sulcus into the earlobe interiorly was made to the underlying adipose tissue.  The flap was then incised and undermined along the cartilage in the subcutaneous fat and elevated.  The flap was then carried over and advanced superiorly into the primary defect. Raised tissue cones were removed as necessary by standard technique on the posterior pinna and anterior earlobe.  The superficial fascia and subcutaneous layers were closed with buried vertical mattress sutures.  The epidermis was then approximated with simple interrupted and running sutures. Careful attention was given to eversion and even approximation of the wound edges.
Mastoid Interpolation Flap Text: An area of non-hair-bearing postauricular skin was delineated, with width corresponding the length of the auricular wound.  The flap was then incised and undermined at the level of the underlying fascia.  The ear was pinned back with simple sutures above and/or below the defect to reduce tension on the flap and improve flap reach.  The distal flap was then elevated, carried over the primary defect, and sutured into the ear wound with multiple buried vertical mattress sutures to provide eversion along the helical rim and multiple simple and running epidermal sutures.  A Xeroform gauze dressing was placed behind the flap pedicle.
H Plasty Text: Given the location of the defect, shape of the defect and the proximity to free margins a H-plasty was deemed most appropriate for repair.  Using a sterile surgical marker, the appropriate advancement arms of the H-plasty were drawn incorporating the defect and placing the expected incisions within the relaxed skin tension lines where possible. The area thus outlined was incised deep to adipose tissue with a #15 scalpel blade. The skin margins were undermined to an appropriate distance in all directions utilizing iris scissors.  The opposing advancement arms were then advanced into place in opposite direction and anchored with interrupted buried subcutaneous sutures.
Ear Wedge Repair Text: A full helical wedge was designed.  The excision removing the full helical wedge was performed using a #15 blade.  The wound was closed in separate sequential layers (cartilage, skin). The sutures were placed from the apex upward with buried sutures and simple interrupted and running epidermal sutures.  The helical rim was carefully reapproximated with sutures to provide eversion with buried vertical mattress and epidermal mattress interrupted and running sutures for eversion and even approximation.
Surgeon Performing Repair (Optional): Ahmet Buckley M.D.
Scc Pigmented Histology Text: There were aggregates of squamous cells with pigment.
Mart-1 - Positive Histology Text: MART-1 staining demonstrates areas of higher density and clustering of melanocytes with Pagetoid spread upwards within the epidermis. The surgical margins are positive for tumor cells.
Information: Selecting Yes will display possible errors in your note based on the variables you have selected. This validation is only offered as a suggestion for you. PLEASE NOTE THAT THE VALIDATION TEXT WILL BE REMOVED WHEN YOU FINALIZE YOUR NOTE. IF YOU WANT TO FAX A PRELIMINARY NOTE YOU WILL NEED TO TOGGLE THIS TO 'NO' IF YOU DO NOT WANT IT IN YOUR FAXED NOTE.
Initial Size Of Lesion: 0.7
Retention Suture Text: Retention sutures were placed to support the closure and prevent dehiscence.
Postop Diagnosis: same
Bcc Macronodular Histology Text: There were large aggregates of basaloid cells.
Crescentic Intermediate Repair Preamble Text (Leave Blank If You Do Not Want): Undermining was performed with blunt dissection.
Hemigard Intro: Due to skin fragility and wound tension, it was decided to use HEMIGARD adhesive retention suture devices to permit a linear closure. The skin was cleaned and dried for a 6cm distance away from the wound. Excessive hair, if present, was removed to allow for adhesion.
V-Y Flap Text: An island pedicle flap was designed in a V-Y advancement.  The flap was incised to the underlying adipose tissue, and the tissue surrounding the operative site was undermined extensively with blunt scissor dissection.  The flap was also undermined in the subcutaneous fat at each pole to increase mobility leaving the center based on a single pedicle.  The island pedicle flap carried over the primary defect and was secured into place with deep sutures.  The superficial fascia and subcutaneous layers were closed with buried vertical mattress sutures.  The epidermis was then approximated with simple interrupted and running sutures.  Careful attention was given to eversion and even approximation of the edges.
Spiral Flap Text: The defect edges were debeveled with a #15 scalpel blade.  Given the location of the defect, shape of the defect and the proximity to free margins a spiral flap was deemed most appropriate.  Using a sterile surgical marker, an appropriate rotation flap was drawn incorporating the defect and placing the expected incisions within the relaxed skin tension lines where possible. The area thus outlined was incised deep to adipose tissue with a #15 scalpel blade.  The skin margins were undermined to an appropriate distance in all directions utilizing iris scissors.
No Residual Tumor Seen Histology Text: There were no malignant cells seen in the sections examined.
Advancement Flap (Double) Text: The tissue surrounding the operative site was undermined extensively with blunt scissor dissection.  A bilateral advancement flap was designed.  The flaps were incised to the underlying adipose tissue.  The flaps were then undermined, elevated, and carried over the primary defect and sutured into place.  The flaps were then advanced into the primary defect.  Raised tissue cones were removed as necessary by standard technique.  The superficial fascia and subcutaneous layers were closed with buried vertical mattress sutures.  The epidermis was then approximated with simple interrupted and running sutures.  Careful attention was given to eversion and even approximation of the wound edges.
Estlander Flap (Upper To Lower Lip) Text: The defect of the lower lip was assessed and measured.  Given the location and size of the defect, an Estlander flap was deemed most appropriate.  Using a sterile surgical marker, an appropriate Estlander flap was measured and drawn on the upper lip. Local anesthesia was then infiltrated. A scalpel was then used to incise the lateral aspect of the flap, through skin, muscle and mucosa, leaving the flap pedicled medially.  The flap was then rotated and positioned to fill the lower lip defect.  The flap was then sutured into place with a three layer technique, closing the orbicularis oris muscle layer with subcutaneous buried sutures, followed by a mucosal layer and an epidermal layer.
Burow's Graft Text: The tissue surrounding the operative site was extensively undermined.  A raised cone was removed from one end of the defect to serve as a full thickness graft. This area was then closed by complex layered closure using buried vertical mattress sutures to close the superficial fascia and subcutaneous fat, and simple interrupted and running sutures to approximate the epidermis.  The obtained graft was defatted and placed in saline gauze.  The graft was trimmed to fit the defect as needed with blunt scissors.  The graft was then sutured into the recipient site with interrupted and basting sutures.  Careful attention was given to even approximation of the wound edges.
Cheek Interpolation Flap Text: An interpolation flap was designed on the adjacent paranasal cheek skin in a triangular island configuration.  The flap was then incised and elevated, and undermined to the pedicle base utilizing perforating arterioles for vascular supply.  The edges were bluntly undermined at the periphery of the donor and recipient beds.  Adipose tissue was trimmed from the distal flap.  The flap was then carried over and sutured into the wound using key buried vertical mattress sutures and multiple Prolene simple interrupted sutures.  The donor defect was closed with multiple buried sutures, and interrupted and running sutures.  Xeroform dressing was placed behind the flap's pedicle.
Consent (Lip)/Introductory Paragraph: The rationale for Mohs was explained to the patient and consent was obtained. The risks, benefits and alternatives to therapy were discussed in detail. Specifically, the risks of lip deformity, changes in the oral aperture, infection, scarring, bleeding, prolonged wound healing, incomplete removal, allergy to anesthesia, nerve injury and recurrence were addressed. Prior to the procedure, the treatment site was clearly identified and confirmed by the patient. All components of Universal Protocol/PAUSE Rule completed.
Partial Purse String (Intermediate) Text: Given the location of the defect and the characteristics of the surrounding skin an intermediate purse string closure was deemed most appropriate.  Undermining was performed circumferentially around the surgical defect.  A purse string suture was then placed and tightened. Wound tension only allowed a partial closure of the circular defect.
Repair Anesthesia Method: local infiltration
O-T Plasty Text: The defect edges were debeveled with a #15 scalpel blade.  Given the location of the defect, shape of the defect and the proximity to free margins an O-T plasty was deemed most appropriate.  Using a sterile surgical marker, an appropriate O-T plasty was drawn incorporating the defect and placing the expected incisions within the relaxed skin tension lines where possible.    The area thus outlined was incised deep to adipose tissue with a #15 scalpel blade.  The skin margins were undermined to an appropriate distance in all directions utilizing iris scissors.
Consent 1/Introductory Paragraph: The rationale for Mohs was explained to the patient and consent was obtained. The risks, benefits and alternatives to therapy were discussed in detail. Specifically, the risks of infection, scarring, bleeding, prolonged wound healing, incomplete removal, allergy to anesthesia, nerve injury and recurrence were addressed. Prior to the procedure, the treatment site was clearly identified and confirmed by the patient. All components of Universal Protocol/PAUSE Rule completed.
Melolabial Transposition Flap Text: A nasolabial transposition flap was designed as a single stage procedure.  The incision lines were designed along the nasolabial fold and medial cheek.  Removal of the superior tissue cone was designed by triangulation with the apex pointed toward the medial canthus.  The flap was incised to the underlying adipose tissue.  The flap was then undermined, elevated, and carried over the primary defect and sutured into place.  The tissue surrounding the operative site was undermined extensively with blunt scissor dissection.  On the nose and medial cheek this was performed at the level of the periosteum. Hemostasis was obtained using electrocoagulation.  The flap was then incised, and defatted using blunt scissor dissection.  The cheek was then carried over and advanced medially utilizing pyriform aperture periosteal tacking sutures, carrying over the flap into the primary defect and to recreate the alar crease concavity.  The secondary defect was then closed by complex layered closure. The primary defect wound bed was trimmed as necessary to create a concave surface at the flap was sutured into place.  The superficial fascia and subcutaneous layers were closed with buried vertical mattress sutures.  The epidermis was approximated with simple interrupted and running sutures.  Careful attention was given to eversion and even approximation of the wound edges.  A pressure dressing was applied.
Bcc Superficial Pigmented Histology Text: There were aggregates of basaloid cells with pigment budding from the epidermis.
Epidermal Closure Graft Donor Site (Optional): simple interrupted
Undermining Type: Entire Wound
Cytokeratin Ae1/Ae3 - Negative Histology Text: CK staining demonstrates a normal density and pattern.
Consent 2/Introductory Paragraph: Mohs surgery was explained to the patient and consent was obtained. The risks, benefits and alternatives to therapy were discussed in detail. Specifically, the risks of infection, scarring, bleeding, prolonged wound healing, incomplete removal, allergy to anesthesia, nerve injury and recurrence were addressed. Prior to the procedure, the treatment site was clearly identified and confirmed by the patient. All components of Universal Protocol/PAUSE Rule completed.
Rhombic Flap Text: A rhombic transposition flap was designed.  The flap was incised to the underlying adipose tissue.  The tissue surrounding the operative site was undermined extensively with blunt scissor dissection.    The flap was then incised, undermined, elevated, defatted, and carried over the primary defect.  The secondary defect was first closed by complex layered closure, moving the flap into the primary defect.  The superficial fascia and subcutaneous layers were closed with buried vertical mattress sutures.  The epidermis was then approximated with simple interrupted and running sutures.  Careful attention was given to eversion and even approximation of the wound edges.
Perineural Invasion (For Histology - Be Specific If Possible): absent
Mixed Superficial And Nodular Bcc Histology Text: There were aggregates of basaloid cells in a superficial and nodular pattern.
Epidermal Closure: running
Scc In Situ Histology Text: There was full thickness epidermal squamous cell atypia and proliferation in a SCCIS pattern with overlying hypogranulosis and parakeratosis.
Number Of Hemigard Strips Per Side: 1
Scc Moderately Differentiated Histology Text: There were aggregates of moderately-differentiated squamous cells.
Mauc Instructions: By selecting yes to the question below the MAUC number will be added into the note.  This will be calculated automatically based on the diagnosis chosen, the size entered, the body zone selected (H,M,L) and the specific indications you chose. You will also have the option to override the Mohs AUC if you disagree with the automatically calculated number and this option is found in the Case Summary tab.
Vermilion Border Text: The closure involved the vermilion border.
Tumor Debulked?: dermablade
Mustarde Flap Text: The defect edges were debeveled with a #15 scalpel blade.  Given the size, depth and location of the defect and the proximity to free margins a Mustarde flap was deemed most appropriate.  Using a sterile surgical marker, an appropriate flap was drawn incorporating the defect. The area thus outlined was incised with a #15 scalpel blade.  The skin margins were undermined to an appropriate distance in all directions utilizing iris scissors.
Bcc Adenoid Histology Text: There were aggregates of basaloid cells demonstrating an adenoid or cribiform pattern.
Incidental Squamous Cell Carcinoma In Situ Histology Text: Incidental SCIS was noted at the periphery of the Mohs section.  If clear margins were not obtained, patient was advised to considered 35%TCA followed by topical 5FU once fully healed.
No Repair - Repaired With Adjacent Surgical Defect Text (Leave Blank If You Do Not Want): After obtaining clear surgical margins the defect was repaired concurrently with the other surgical defect which was in close approximation.
Peng Advancement Flap Text: The defect edges were debeveled with a #15 scalpel blade.  Given the location of the defect, shape of the defect and the proximity to free margins a Peng advancement flap was deemed most appropriate.  Using a sterile surgical marker, an appropriate advancement flap was drawn incorporating the defect and placing the expected incisions within the relaxed skin tension lines where possible. The area thus outlined was incised deep to adipose tissue with a #15 scalpel blade.  The skin margins were undermined to an appropriate distance in all directions utilizing iris scissors and carried over to close the primary defect.
Date Of Previous Biopsy (Optional): 8/29/2024
Mohs Method Verbiage: An incision at a 45 degree angle following the standard Mohs approach was done and the specimen was harvested as a microscopic controlled layer.
Full Thickness Lip Wedge Repair (Flap) Text: The vermilion border of the lip adjacent to the wound was carefully marked with gentian violet.  A full lip wedge was designed. The excision removing the full lip wedge was performed using a #15 blade.  The labial arteries were isolated and indirectly electrocoagulated.  The wound was closed in separate sequential layers (labial mucosa, orbicularis oris muscle and skin) using buried sutures and mucosal and epidermal simple interrupted, running and locked running sutures.  The sutures were placed from the apex upward. The vermilion border was carefully reapproximated.
Skin Substitute Text: The defect edges were debeveled with a #15 scalpel blade.  Given the location of the defect, shape of the defect and the proximity to free margins a skin substitute graft was deemed most appropriate.  The graft material was trimmed to fit the size of the defect. The graft was then placed in the primary defect and oriented appropriately.
O-T Advancement Flap Text: An O-T rotation advancement flap was designed.  The flap was incised to the underlying adipose tissue.  The flap was then undermined, elevated, and carried over the primary defect and sutured into place.  The flap was rotated, advanced, elevated, and carried into the primary defect, creating a secondary defect.  Raised tissue cones were removed as necessary by standard technique.  The superficial and subcutaneous layers were closed with buried vertical mattress sutures.  The epidermis was then approximated with simple interrupted and running sutures. Careful attention was given to eversion and even approximation of the wound edges.
Complex Repair And Flap Additional Text (Will Appearing After The Standard Complex Repair Text): The complex repair was not sufficient to completely close the primary defect. The remaining additional defect was repaired with the flap mentioned below.
Closure 2 Information: This tab is for additional flaps and grafts, including complex repair and grafts and complex repair and flaps. You can also specify a different location for the additional defect, if the location is the same you do not need to select a new one. We will insert the automated text for the repair you select below just as we do for solitary flaps and grafts. Please note that at this time if you select a location with a different insurance zone you will need to override the ICD10 and CPT if appropriate.
Xenograft Text: The defect edges were debeveled with a #15 scalpel blade.  Given the location of the defect, shape of the defect and the proximity to free margins a xenograft was deemed most appropriate.  The graft was then trimmed to fit the size of the defect.  The graft was then placed in the primary defect and oriented appropriately.
Bilateral Helical Rim Advancement Flap Text: A bilateral helical advancement flap was designed. The tissue surrounding the operative site was undermined extensively with blunt scissor dissection.  A single incision along the helical sulcus into the earlobe interiorly was made to the underlying adipose tissue.  The flap was then incised and undermined along the cartilage in the subcutaneous fat and elevated.  The flap was then carried over and advanced superiorly into the primary defect. Raised tissue cones were removed as necessary by standard technique on the posterior pinna and anterior earlobe.  The superficial fascia and subcutaneous layers were closed with buried vertical mattress sutures.  The epidermis was then approximated with simple interrupted and running sutures. Careful attention was given to eversion and even approximation of the wound edges.
Anesthesia Type: 1% lidocaine with 1:100,000 epinephrine and 408mcg clindamycin/ml and a 1:10 solution of 8.4% sodium bicarbonate
W Plasty Text: The lesion was extirpated to the level of the fat with a #15 scalpel blade.  Given the location of the defect, shape of the defect and the proximity to free margins a W-plasty was deemed most appropriate for repair.  Using a sterile surgical marker, the appropriate transposition arms of the W-plasty were drawn incorporating the defect and placing the expected incisions within the relaxed skin tension lines where possible.    The area thus outlined was incised deep to adipose tissue with a #15 scalpel blade.  The skin margins were undermined to an appropriate distance in all directions utilizing iris scissors.  The opposing transposition arms were then transposed into place in opposite direction and anchored with interrupted buried subcutaneous sutures.
Consent (Spinal Accessory)/Introductory Paragraph: The rationale for Mohs was explained to the patient and consent was obtained. The risks, benefits and alternatives to therapy were discussed in detail. Specifically, the risks of damage to the spinal accessory nerve, infection, scarring, bleeding, prolonged wound healing, incomplete removal, allergy to anesthesia, and recurrence were addressed. Prior to the procedure, the treatment site was clearly identified and confirmed by the patient. All components of Universal Protocol/PAUSE Rule completed.
Alar Island Pedicle Flap Text: The defect edges were debeveled with a #15 scalpel blade.  Given the location of the defect, shape of the defect and the proximity to the alar rim an island pedicle advancement flap was deemed most appropriate.  Using a sterile surgical marker, an appropriate advancement flap was drawn incorporating the defect, outlining the appropriate donor tissue and placing the expected incisions within the nasal ala running parallel to the alar rim. The area thus outlined was incised with a #15 scalpel blade.  The skin margins were undermined minimally to an appropriate distance in all directions around the primary defect and laterally outward around the island pedicle utilizing iris scissors.  There was minimal undermining beneath the pedicle flap.
Which Eyelid Repair Cpt Are You Using?: 01808
Bcc  Nodulocystic Histology Text: There were aggregates of basaloid cells.
Paramedian Forehead Flap Text: A paramedian forehead flap was designed.  An exact template was made of the defect using gentian violet and a non-adherent dressing.  The template was then flattened and traced on the ipsilateral forehead at the hairline with the base of the pedicle at the area of the supratrochlear artery just lateral to the glabellar frown crease.  The pedicle was designed with a base width of 1.0 - 1.5 cm.  The arc of rotation was then checked with a gauze sponge.  The flap was incised to the subcutis distally and then to the galea proximally and elevated to the glabella.  The tissue on the forehead adjacent to the donor site was undermined extensively with blunt scissor dissection in the subgaleal plane.  The flap was then defatted distally using blunt scissor dissection.  The secondary defect was then closed by complex layered closure.  The superficial fascia and subcutaneous layers were closed with buried vertical mattress sutures.  The epidermis was approximated with simple interrupted and running sutures.  Careful attention was given to eversion and even approximation of the wound edges.  The distal flap was thinned of deeper fat lobules preserving a thin layer of fat and the dermal plexus vasculature.  The nasal recipient site was bluntly undermined circumferentially.  The flap was then elevated, transferred, and carried over to the nose and sewn into place in a layered fashion with burried vertical mattress, interrupted and running sutures. The base and pedicle of the flapped were wrapped in Vaseline impregnated gauze.  The pedicle will be divided in 3-4 weeks.
Ftsg Text: A wound-size template was made using a non-adherent dressing.  The template was then outlined on the donor site with gentian violet.  Local anesthesia was obtained.  The excision at the donor site was carried down to subcutaneous fat.  The harvested graft was placed on a saline gauze.  The donor site was closed by intermediate layered closure. The graft was defatted with blunt scissors.  The graft was then placed on the recipient site and secured at the perimeter.  Careful attention was given to even approximation of the wound edges.  A tie-over bolster dressing with Xeroform gauze and suture was placed over the graft to provide compression.  A pressure dressing was applied to the donor site.
Bcc Pigmented Histology Text: There were aggregates of basaloid cells with pigment.
Ear Star Wedge Flap Text: The defect edges were debeveled with a #15 blade scalpel.  Given the location of the defect and the proximity to free margins (helical rim) an ear star wedge flap was deemed most appropriate.  Using a sterile surgical marker, the appropriate flap was drawn incorporating the defect and placing the expected incisions between the helical rim and antihelix where possible.  The area thus outlined was incised through and through with a #15 scalpel blade.
Purse String (Simple) Text: Given the location of the defect and the characteristics of the surrounding skin a purse string closure was deemed most appropriate.  Undermining was performed circumferentially around the surgical defect.  A purse string suture was then placed and tightened.
Complex Repair And Graft Additional Text (Will Appearing After The Standard Complex Repair Text): The complex repair was not sufficient to completely close the primary defect. The remaining additional defect was repaired with the graft mentioned below.
Surgeon/Pathologist Verbiage (Will Incorporate Name Of Surgeon From Intro If Not Blank): operated in two distinct and integrated capacities as the surgeon and pathologist.
Z Plasty Text: To lengthen the scar defect and thereby avoid distortion of the free margin at the pole of the wound, transposition flaps in the form of a z-plasty were designed in the mid portion of opposing wound margins.  These flaps were then incised, elevated, carried over, and transposed using simple interrupted and 3 point tip sutures, thus increasing the overall vertical length of the incision line.
Consent (Near Eyelid Margin)/Introductory Paragraph: The rationale for Mohs was explained to the patient and consent was obtained. The risks, benefits and alternatives to therapy were discussed in detail. Specifically, the risks of ectropion or eyelid deformity, infection, scarring, bleeding, prolonged wound healing, incomplete removal, allergy to anesthesia, nerve injury and recurrence were addressed. Prior to the procedure, the treatment site was clearly identified and confirmed by the patient. All components of Universal Protocol/PAUSE Rule completed.
Double Island Pedicle Flap Text: The defect edges were debeveled with a #15 scalpel blade.  Given the location of the defect, shape of the defect and the proximity to free margins a double island pedicle advancement flap was deemed most appropriate.  Using a sterile surgical marker, an appropriate advancement flap was drawn incorporating the defect, outlining the appropriate donor tissue and placing the expected incisions within the relaxed skin tension lines where possible.    The area thus outlined was incised deep to adipose tissue with a #15 scalpel blade.  The skin margins were undermined to an appropriate distance in all directions around the primary defect and laterally outward around the island pedicle utilizing iris scissors.  There was minimal undermining beneath the pedicle flap.
Nasal Turnover Hinge Flap Text: The defect edges were debeveled with a #15 scalpel blade.  Given the size, depth, location of the defect and the defect being full thickness a nasal turnover hinge flap was deemed most appropriate.  Using a sterile surgical marker, an appropriate hinge flap was drawn incorporating the defect. The area thus outlined was incised with a #15 scalpel blade. The flap was designed to recreate the nasal mucosal lining and the alar rim. The skin margins were undermined to an appropriate distance in all directions utilizing iris scissors.
Scc Ka Subtype Histology Text: There were aggregates of glassy squamous cells consistent with keratoacanthoma.
Brow Lift Text: A midfrontal incision was made medially to the defect to allow access to the tissues just superior to the left eyebrow. Following careful dissection inferiorly in a supraperiosteal plane to the level of the left eyebrow, several 3-0 monocryl sutures were used to resuspend the eyebrow orbicularis oculi muscular unit to the superior frontal bone periosteum. This resulted in an appropriate reapproximation of static eyebrow symmetry and correction of the left brow ptosis.
Missing Epidermis Histology Text: Missing tissue was noted on frozen sections and additional slides were cut until present.  If no additional tissue containing epidermis was available, then an additional stage was excised.
Bcc Micronodular Histology Text: There were numerous small/micronodular aggregates of palisaded basaloid cells with peripheral clefting
Hemigard Postcare Instructions: The HEMIGARD strips are to remain completely dry for at least 5-7 days.
Referring Physician (Optional): Roseanna Hardy MD
Subsequent Stages Histo Method Verbiage: Using a similar technique to that described above, a thin layer of tissue was removed from all areas where tumor was visible on the previous stage.  The tissue was again oriented, mapped, dyed, and processed as above.
Localized Dermabrasion With Wire Brush Text: The patient was draped in routine manner.  Localized dermabrasion using 220 grit sterile sandpaper was performed in routine manner to papillary dermis. This spot dermabrasion is being performed to complete skin cancer reconstruction. It also will eliminate the other sun damaged precancerous cells that are known to be part of the regional effect of a lifetime's worth of sun exposure. This localized dermabrasion is therapeutic and should not be considered cosmetic in any regard.
Advancement-Rotation Flap Text: The defect edges were debeveled with a #15 scalpel blade.  Given the location of the defect, shape of the defect and the proximity to free margins an advancement-rotation flap was deemed most appropriate.  Using a sterile surgical marker, an appropriate flap was drawn incorporating the defect and placing the expected incisions within the relaxed skin tension lines where possible. The area thus outlined was incised deep to adipose tissue with a #15 scalpel blade.  The skin margins were undermined to an appropriate distance in all directions utilizing iris scissors and carried over to close the primary defect.
Burow's Advancement Flap Text: The defect edges were debeveled with a #15 scalpel blade.  Given the location of the defect and the proximity to free margins a Burow's advancement flap was deemed most appropriate.  Using a sterile surgical marker, the appropriate advancement flap was drawn incorporating the defect and placing the expected incisions within the relaxed skin tension lines where possible.    The area thus outlined was incised deep to adipose tissue with a #15 scalpel blade.  The skin margins were undermined to an appropriate distance in all directions utilizing iris scissors and carried over to close the primary defect.
Inflammation Suggestive Of Cancer Camouflage Histology Text: There was a dense lymphocytic infiltrate which prevented adequate histologic evaluation of adjacent structures and camouflaged potential tumor present.
Debridement Text: The wound edges were debrided prior to proceeding with the closure to facilitate wound healing.
Staged Advancement Flap Text: The defect edges were debeveled with a #15 scalpel blade.  Given the location of the defect, shape of the defect and the proximity to free margins a staged advancement flap was deemed most appropriate.  Using a sterile surgical marker, an appropriate advancement flap was drawn incorporating the defect and placing the expected incisions within the relaxed skin tension lines where possible. The area thus outlined was incised deep to adipose tissue with a #15 scalpel blade.  The skin margins were undermined to an appropriate distance in all directions utilizing iris scissors and carried over to close the primary defect.  That flap was incised, undermined, elevated, and carried over into the primary defect.
Simple / Intermediate / Complex Repair - Final Wound Length In Cm: 3.3
Cartilage Graft Text: A scalpel aluminum foil packet is folded, molded and trimmed to serve as an exact template for the ear cartilage strut required for nasal support prior to flap or graft coverage.  The most precisely matching concave surface is chosen, outlined, prepped and draped in the standard sterile fashion. A semicircular incision is made in the ear skin and a flap elevated to expose the underlying cartilage, which is outlined with the template, incised and removed atraumatically and placed in sterile saline.   Hemostasis with electrocoagulation.  The flap is replaced and repaired with simple interrupted, running and basting sutures with sutures.  A pressure dressing is applied.  The cartilage graft is trimmed to precisely fit the defect, with 2-3 mm of additional length on each end.  The borders of the defect are incised to create pockets into which the cartilage graft is inserted to stabilize it.  The graft is secured to the base of the defect with sutures.
Anesthesia Volume In Cc: 2
O-Z Plasty Text: The defect edges were debeveled with a #15 scalpel blade.  Given the location of the defect, shape of the defect and the proximity to free margins an O-Z plasty (double transposition flap) was deemed most appropriate.  Using a sterile surgical marker, the appropriate transposition flaps were drawn incorporating the defect and placing the expected incisions within the relaxed skin tension lines where possible.    The area thus outlined was incised deep to adipose tissue with a #15 scalpel blade.  The skin margins were undermined to an appropriate distance in all directions utilizing iris scissors.  Hemostasis was achieved with electrocautery.  The flaps were then transposed into place, one clockwise and the other counterclockwise, and anchored with interrupted buried subcutaneous sutures.
Consent (Scalp)/Introductory Paragraph: The rationale for Mohs was explained to the patient and consent was obtained. The risks, benefits and alternatives to therapy were discussed in detail. Specifically, the risks of changes in hair growth pattern secondary to repair, infection, scarring, bleeding, prolonged wound healing, incomplete removal, allergy to anesthesia, nerve injury and recurrence were addressed. Prior to the procedure, the treatment site was clearly identified and confirmed by the patient. All components of Universal Protocol/PAUSE Rule completed.
Trilobed Flap Text: A geometric trilobed transposition flap was designed utilizing 45 degree transposition of each flap lobe to direct tension vectors horizontally along the nasal sidewall to prevent distortion of the alar rim.  The flap was incised to the underlying adipose tissue. The flap was then undermined, elevated, and carried over the primary defect and sutured into place to its pedicle base.  The entire tissue surrounding the operative site was undermined extensively with blunt scissor dissection.  The elliptical tertiary donor area was first closed.  The tertiary lobe was transposed, and carried over to cover the tertiary defect created by the second lobe.  The secondary lobe was transposed, and carried over to cover the secondary defect created by the first lobe of the flap.  The primary lobe of the flap was then transposed, and carried over over the primary defect.  All lobes of the flap were trimmed to fit the recipient sites as needed.  The superficial fascia and subcutaneous layers were closed with buried vertical mattress sutures.  The epidermis was approximated with simple interrupted and running sutures.  Careful attention was given to eversion and even approximation of the wound edges.
Chonodrocutaneous Helical Advancement Flap Text: A helical advancement flap was designed. The tissue surrounding the operative site was undermined extensively with blunt scissor dissection.  A single incision along the helical sulcus into the earlobe interiorly was made to the underlying adipose tissue.  The flap was then undermined along the cartilage in the subcutaneous fat and elevated.  The flap was then carried over and advanced superiorly into the primary defect. Raised tissue cones were removed as necessary by standard technique on the posterior pinna and anterior earlobe.  The superficial fascia and subcutaneous layers were closed with buried vertical mattress sutures.  The epidermis was then approximated with simple interrupted and running sutures. Careful attention was given to eversion and even approximation of the wound edges.
Mercedes Flap Text: The tissue surrounding the operative site was undermined extensively with blunt scissor dissection.  A mercedes advancement flap was designed.  The flaps were incised to the underlying adipose tissue.  The flaps were then undermined, elevated, and carried over the primary defect and sutured into place.  The flaps were then advanced into the primary defect.  Raised tissue cones were removed as necessary by standard technique.  The superficial fascia and subcutaneous layers were closed with buried vertical mattress sutures.  The epidermis was then approximated with simple interrupted and running sutures.  Careful attention was given to eversion and even approximation of the wound edges.
Tarsorrhaphy Text: A tarsorrhaphy was performed using Frost sutures.
Star Wedge Flap Text: The defect edges were debeveled with a #15 scalpel blade.  Given the location of the defect, shape of the defect and the proximity to free margins a star wedge flap was deemed most appropriate.  Using a sterile surgical marker, an appropriate rotation flap was drawn incorporating the defect and placing the expected incisions within the relaxed skin tension lines where possible. The area thus outlined was incised deep to adipose tissue with a #15 scalpel blade.  The skin margins were undermined to an appropriate distance in all directions utilizing iris scissors.
Eyelid Partial Thickness Repair - 47511: The eyelid defect was partial thickness which required a wedge repair of the eyelid. Special care was taken to ensure that the eyelid margin was realligned when placing sutures.
Mohs Rapid Report Verbiage: The area of clinically evident tumor was marked with skin marking ink and appropriately hatched.  The initial incision was made following the Mohs approach through the skin.  The specimen was taken to the lab, divided into the necessary number of pieces, chromacoded and processed according to the Mohs protocol.  This was repeated in successive stages until a tumor free defect was achieved.
M-Plasty Intermediate Repair Preamble Text (Leave Blank If You Do Not Want): The beveled, asymmetric Mohs defect was debeveled with a scalpel to create vertical tissue edges for proper reapproximation.  The tissue surrounding the operative site was undermined with blunt scissor dissection.  The orientation of the closure was placed within the relaxed skin tension lines.  An M-Plasty was designed to reduce the length of the surgical wound to prevent anatomic distortion.  The superficial fascia and subcutaneous layers were first closed using buried vertical mattress sutures.  The epidermis was then approximated with simple interrupted and running sutures. A three-point tip stitch was utilized at the M-Plasty site.  Careful attention was given to eversion and even approximation of the edges.  Standing tissue cones were removed as necessary by standard technique.  A pressure dressing was applied.
Detail Level: Detailed
Cheiloplasty (Less Than 50%) Text: A decision was made to reconstruct the defect with a  cheiloplasty.  The defect was undermined extensively.  Additional orbicularis oris muscle was excised with a 15 blade scalpel.  The defect was converted into a full thickness wedge, of less than 50% of the vertical height of the lip, to facilite a better cosmetic result.  Small vessels were then tied off with 5-0 monocyrl. The orbicularis oris, superficial fascia, adipose and dermis were then reapproximated.  After the deeper layers were approximated the epidermis was reapproximated with particular care given to realign the vermilion border.
Composite Graft Text: A defect size template was made with Telfa and placed on the aspect of the ear, where the convexity and curvature correctly matched the recipient bed.  The graft was excised to include the underlying cartilage and immediately placed in cold saline solution.  Hemostasis with electrocoagulation.  The donor defect was closed with buried vertical mattress sutures followed by simple interrupted sutures.  Standing tissue cones were removed by the standard technique.  Subcutaneous fat was trimmed from the graft.  The composite graft was then placed in the defect and secured with basting sutures.  The edges were precisely approximated with simple interrupted sutures, with care to provide edge eversion.
Mixed Nodular And Infiltrative Bcc Histology Text: There were aggregates of basaloid cells in a nodular and infiltrative pattern.
Double O-Z Plasty Text: The defect edges were debeveled with a #15 scalpel blade.  Given the location of the defect, shape of the defect and the proximity to free margins a Double O-Z plasty (double transposition flap) was deemed most appropriate.  Using a sterile surgical marker, the appropriate transposition flaps were drawn incorporating the defect and placing the expected incisions within the relaxed skin tension lines where possible. The area thus outlined was incised deep to adipose tissue with a #15 scalpel blade.  The skin margins were undermined to an appropriate distance in all directions utilizing iris scissors.  Hemostasis was achieved with electrocautery.  The flaps were then transposed into place, one clockwise and the other counterclockwise, and anchored with interrupted buried subcutaneous sutures.
Dorsal Nasal Flap Text: The defect edges were debeveled with a #15 scalpel blade.  Given the location of the defect and the proximity to free margins a dorsal nasal flap was deemed most appropriate.  Using a sterile surgical marker, an appropriate dorsal nasal flap was drawn around the defect.    The area thus outlined was incised deep to adipose tissue with a #15 scalpel blade.  The skin margins were undermined to an appropriate distance in all directions utilizing iris scissors.
Consent 3/Introductory Paragraph: I gave the patient a chance to ask questions they had about the procedure.  Following this I explained the Mohs procedure and consent was obtained. The risks, benefits and alternatives to therapy were discussed in detail. Specifically, the risks of infection, scarring, bleeding, prolonged wound healing, incomplete removal, allergy to anesthesia, nerve injury and recurrence were addressed. Prior to the procedure, the treatment site was clearly identified and confirmed by the patient. All components of Universal Protocol/PAUSE Rule completed.
Graft Donor Site Bandage (Optional-Leave Blank If You Don't Want In Note): Pressure bandage were applied to the donor site.
Interpolation Flap Text: A decision was made to reconstruct the defect utilizing an interpolation axial flap and a staged reconstruction.  A telfa template was made of the defect.  This telfa template was then used to outline the interpolation flap.  The donor area for the pedicle flap was then injected with anesthesia.  The flap was excised through the skin and subcutaneous tissue down to the layer of the underlying musculature.  The interpolation flap was carefully excised within this deep plane to maintain its blood supply.  The edges of the donor site were undermined.   The donor site was closed in a primary fashion.  The pedicle was then rotated into position and sutured.  Once the tube was sutured into place, adequate blood supply was confirmed with blanching and refill.  The pedicle was then wrapped with xeroform gauze and dressed appropriately with a telfa and gauze bandage to ensure continued blood supply and protect the attached pedicle.
Scc In Situ With Follicular Extension Histology Text: There was full thickness epidermal squamous cell atypia and proliferation in a SCCIS pattern with overlying hypogranulosis, parakeratosis, and follicular or adnexal extension.
Scc Poorly Differentiated Histology Text: There were aggregates of poorly-differentiated squamous cells.
Dressing: pressure dressing with telfa
Bcc Infiltrative Histology Text: There were numerous aggregates of basaloid cells demonstrating an infiltrative pattern.
Retention Suture Bite Size: 3 mm
Nostril Rim Text: The closure involved the nostril rim.
Estimated Blood Loss (Cc): minimal
Hatchet Flap Text: The defect edges were debeveled with a #15 scalpel blade.  Given the location of the defect, shape of the defect and the proximity to free margins a hatchet flap was deemed most appropriate.  Using a sterile surgical marker, an appropriate hatchet flap was drawn incorporating the defect and placing the expected incisions within the relaxed skin tension lines where possible.    The area thus outlined was incised deep to adipose tissue with a #15 scalpel blade.  The skin margins were undermined to an appropriate distance in all directions utilizing iris scissors.
Previous Accession (Optional): [id number]
O-L Flap Text: The defect edges were debeveled with a #15 scalpel blade.  Given the location of the defect, shape of the defect and the proximity to free margins an O-L flap was deemed most appropriate.  Using a sterile surgical marker, an appropriate advancement flap was drawn incorporating the defect and placing the expected incisions within the relaxed skin tension lines where possible.    The area thus outlined was incised deep to adipose tissue with a #15 scalpel blade.  The skin margins were undermined to an appropriate distance in all directions utilizing iris scissors and carried over to close the primary defect.
Bcc Infundibulocystic Histology Text: There were aggregates of basaloid cells demonstrating an infundibulocystic pattern.
Suturegard Intro: Intraoperative tissue expansion was performed, utilizing the SUTUREGARD device, in order to reduce wound tension.
Purse String (Intermediate) Text: Given the location of the defect and the characteristics of the surrounding skin a purse string intermediate closure was deemed most appropriate.  Undermining was performed circumferentially around the surgical defect.  A purse string suture was then placed and tightened.
Rotation Flap Text: A rotation flap was designed.  The flap was incised to the underlying adipose tissue.  The tissue surrounding the operative site was undermined extensively with blunt scissor dissection.  The flap was then undermined, elevated, rotated and carried over the primary defect and sutured into place.  Hemostasis was obtained using electrocoagulation.  Raised tissue cones were removed as necessary by standard technique.  The superficial fascia and subcutaneous layers were closed with buried vertical mattress sutures.  The epidermis was then approximated with simple interrupted and running sutures.  Careful attention was given to eversion and even approximation of the wound edges.
O-Z Flap Text: An O-Z rotation flap was designed.  The flap was incised to the underlying adipose tissue.  The flap was then undermined, elevated, and carried over the primary defect and sutured into place and elevated, rotated, and carried over into the primary defect, creating a secondary defect.  Raised tissue cones were removed as necessary by standard technique. The superficial fascia and subcutaneous layers were closed with buried vertical mattress sutures. The epidermis was then approximated with simple interrupted and running sutures.  Careful attention was given to eversion and even approximation of the wound edges.
M-Plasty Complex Repair Preamble Text (Leave Blank If You Do Not Want): The beveled, asymmetric Mohs defect was debeveled with a scalpel to create vertical tissue edges for proper reapproximation.  The tissue surrounding the operative site was undermined extensively with blunt scissor dissection.  The orientation of the closure was placed within the relaxed skin tension lines.  An M-Plasty was designed to reduce the length of the surgical wound to prevent anatomic distortion.  The superficial fascia and subcutaneous layers were first closed using buried vertical mattress sutures.  The epidermis was then approximated with simple interrupted and running sutures. A three-point tip stitch was utilized at the M-Plasty site.  Careful attention was given to eversion and even approximation of the edges.  Standing tissue cones were removed as necessary by standard technique.  A pressure dressing was applied.
Adjacent Tissue Transfer Text: An adjacent tissue transfer flap was designed.  The tissue surrounding the operative site was undermined extensively with blunt scissors dissection.  The flap was incised to the underlying adipose tissue.  The flap was then undermined, elevated, and carried over the primary defect and sutured into place.  Raised tissue cones were removed as necessary by standard technique.  The superficial fascia and subcutaneous layers were closed with buried vertical mattress sutures.  The epidermis was then approximated with simple interrupted and running sutures.  Careful attention was given to eversion and even approximation of the wound edges.
Double Z Plasty Text: To lengthen the scar defect and thereby avoid distortion of the free margin at the pole of the wound, transposition flaps in the form of a double z-plasty were designed in the mid portion of opposing wound margins.  These flaps were then incised, elevated, carried over, and transposed using simple interrupted and 3 point tip sutures, thus increasing the overall vertical length of the incision line.
Nasalis-Muscle-Based Myocutaneous Island Pedicle Flap Text: Using a #15 blade, an incision was made around the donor flap to the level of the nasalis muscle. Wide lateral undermining was then performed in both the subcutaneous plane above the nasalis muscle, and in a submuscular plane just above periosteum. This allowed the formation of a free nasalis muscle axial pedicle (based on the angular artery) which was still attached to the actual cutaneous flap, increasing its mobility and vascular viability. Hemostasis was obtained with pinpoint electrocoagulation. The flap was mobilized into position and the pivotal anchor points positioned and stabilized with buried interrupted sutures. Subcutaneous and dermal tissues were closed in a multilayered fashion with sutures. Tissue redundancies were excised, and the epidermal edges were apposed without significant tension and sutured with sutures.
Mohs Histo Method Verbiage: Each section was then chromacoded and processed in the Mohs lab using the Mohs protocol and submitted for frozen section.
Consent (Ear)/Introductory Paragraph: The rationale for Mohs was explained to the patient and consent was obtained. The risks, benefits and alternatives to therapy were discussed in detail. Specifically, the risks of ear deformity, infection, scarring, bleeding, prolonged wound healing, incomplete removal, allergy to anesthesia, nerve injury and recurrence were addressed. Prior to the procedure, the treatment site was clearly identified and confirmed by the patient. All components of Universal Protocol/PAUSE Rule completed.
Abbe Flap (Upper To Lower Lip) Text: The defect of the lower lip was assessed and measured.  Given the location and size of the defect, an Abbe flap was deemed most appropriate.  Using a sterile surgical marker, an appropriate Abbe flap was measured and drawn on the upper lip. Local anesthesia was then infiltrated.  A scalpel was then used to incise the upper lip through and through the skin, vermilion, muscle and mucosa, leaving the flap pedicled on the opposite side.  The flap was then rotated and transferred to the lower lip defect.  The flap was then sutured into place with a three layer technique, closing the orbicularis oris muscle layer with subcutaneous buried sutures, followed by a mucosal layer and an epidermal layer.
Island Pedicle Flap-Requiring Vessel Identification Text: The defect edges were debeveled with a #15 scalpel blade.  Given the location of the defect, shape of the defect and the proximity to free margins an island pedicle advancement flap was deemed most appropriate.  Using a sterile surgical marker, an appropriate advancement flap was drawn, based on the axial vessel mentioned above, incorporating the defect, outlining the appropriate donor tissue and placing the expected incisions within the relaxed skin tension lines where possible.    The area thus outlined was incised deep to adipose tissue with a #15 scalpel blade.  The skin margins were undermined to an appropriate distance in all directions around the primary defect and laterally outward around the island pedicle utilizing iris scissors.  There was minimal undermining beneath the pedicle flap.
Medical Necessity Statement: Based on my medical judgement, Mohs surgery is the most appropriate treatment for this cancer compared to other treatments.
Split-Thickness Skin Graft Text: The defect edges were debeveled with a #15 scalpel blade.  Given the location of the defect, shape of the defect and the proximity to free margins a split thickness skin graft was deemed most appropriate.  Using a sterile surgical marker, the primary defect shape was transferred to the donor site. The split thickness graft was then harvested.  The skin graft was then placed in the primary defect and oriented appropriately.
Banner Transposition Flap Text: A transposition flap with a banner configuration was delineated in the adjacent donor area.  The flap was incised, widely undermined, elevated, and carried over the primary defect, then sutured into place.  Key buried subcutaneous sutures were placed to close the donor area.  The flap was then secured in place with buried vertical mattress sutures and the epidermis was approximated with simple interrupted and running sutures.  The donor area was similarly closed.  All tissue redundancies were repaired in standard fashion.
Scc Desmoplastic Subtype Histology Text: There were aggregates of squamous cells in a desmoplastic stroma.
Tumor Depth: Less than 6mm from granular layer and no invasion beyond the subcutaneous fat
Deep Sutures: 2-0 PDS
Body Location Override (Optional - Billing Will Still Be Based On Selected Body Map Location If Applicable): scalp vertex
Area Suspicious For Tumor Histology Text: An area suspicious for additional tumor was noted and excised with additional stage(s).
Non-Graft Cartilage Fenestration Text: The area was anesthetized to include the underlying cartilage and soft tissue on the opposite surface.  3 mm punch fenestrations were performed through the cartilage to expose the underlying perichondrium and subcutaneous tissue.  The fenestrations improve wound healing by expediting granulation tissue formation and re-epithelization in the bare cartilage defect base.
Consent Type: Consent 1 (Standard)
Donor Site Anesthesia Type: same as repair anesthesia
Area H Indication Text: Tumors in this location are included in Area H (eyelids, eyebrows, nose, lips, chin, ear, pre-auricular, post-auricular, temple, genitalia, hands, feet, ankles and areola).  Tissue conservation is critical in these anatomic locations.
Area M Indication Text: Tumors in this location are included in Area M (cheek, forehead, scalp, neck, jawline and pretibial skin).  Mohs surgery is indicated for tumors in these anatomic locations.
Follicular Atypia Histology Text: Follicular atypia was noted and reviewed with patient, patient was advised to monitor and report any skin changes.
Post-Care Instructions: I reviewed with the patient in detail post-care instructions. Patient is not to engage in any heavy lifting, exercise, or swimming for the next 14 days. Should the patient develop any fevers, chills, bleeding, severe pain patient will contact the office immediately.
Wound Check: 28 days
Graft Cartilage Fenestration Text: The area was anesthetized to include the underlying cartilage and soft tissue on the opposite surface.  3 mm punch fenestrations were performed through the cartilage to expose the underlying perichondrium and subcutaneous tissue.  The fenestrations improve wound healing by expediting granulation tissue formation and providing a vascular bed for graft nourishment
Crescentic Advancement Flap Text: The defect edges were debeveled with a #15 scalpel blade.  Given the location of the defect and the proximity to free margins a crescentic advancement flap was deemed most appropriate.  Using a sterile surgical marker, the appropriate advancement flap was drawn incorporating the defect and placing the expected incisions within the relaxed skin tension lines where possible.    The area thus outlined was incised deep to adipose tissue with a #15 scalpel blade.  The skin margins were undermined to an appropriate distance in all directions utilizing iris scissors and carried over to close the primary defect.
Transposition Flap Text: A transposition flap was delineated in the adjacent donor area.  The flap was incised, widely undermined, elevated, and carried over the primary defect, then sutured into place.  Key buried subcutaneous sutures were placed to close the donor area.  The flap was then secured in place with buried vertical mattress sutures and the epidermis was approximated with simple interrupted and running sutures.  The donor area was similarly closed.  All tissue redundancies were repaired in standard fashion.
Modified Advancement Flap Text: The defect edges were debeveled with a #15 scalpel blade.  Given the location of the defect, shape of the defect and the proximity to free margins a modified advancement flap was deemed most appropriate.  Using a sterile surgical marker, an appropriate advancement flap was drawn incorporating the defect and placing the expected incisions within the relaxed skin tension lines where possible.    The area thus outlined was incised deep to adipose tissue with a #15 scalpel blade.  The skin margins were undermined to an appropriate distance in all directions utilizing iris scissors and carried over to close the primary defect.
Complex Repair Preamble Text (Leave Blank If You Do Not Want): The beveled, asymmetric Mohs defect was debeveled with a scalpel to create vertical tissue edges for proper reapproximation.  The tissue surrounding the operative site was undermined extensively with blunt scissor dissection to reduce tension and prevent tissue distortion.  The orientation of the closure was placed within the relaxed skin tension lines.  The superficial fascia and subcutaneous layers were then closed using buried vertical mattress sutures.  The epidermis was then approximated with simple interrupted and running sutures. Careful attention was given to eversion and even approximation of the edges.  Standing tissue cones were removed as necessary by standard technique.  A pressure dressing was applied.
Intermediate Repair And Flap Additional Text (Will Appearing After The Standard Complex Repair Text): The intermediate repair was not sufficient to completely close the primary defect. The remaining additional defect was repaired with the flap mentioned below.
V-Y Plasty Text: The defect edges were debeveled with a #15 scalpel blade.  Given the location of the defect, shape of the defect and the proximity to free margins an V-Y advancement flap was deemed most appropriate.  Using a sterile surgical marker, an appropriate advancement flap was drawn incorporating the defect and placing the expected incisions within the relaxed skin tension lines where possible.    The area thus outlined was incised deep to adipose tissue with a #15 scalpel blade.  The skin margins were undermined to an appropriate distance in all directions utilizing iris scissors.
Consent (Temporal Branch)/Introductory Paragraph: The rationale for Mohs was explained to the patient and consent was obtained. The risks, benefits and alternatives to therapy were discussed in detail. Specifically, the risks of damage to the temporal branch of the facial nerve, infection, scarring, bleeding, prolonged wound healing, incomplete removal, allergy to anesthesia, and recurrence were addressed. Prior to the procedure, the treatment site was clearly identified and confirmed by the patient. All components of Universal Protocol/PAUSE Rule completed.
Mixed Nodular And Micronodular Bcc Histology Text: There were aggregates of basaloid cells in a nodular and micro nodular pattern.
Bcc  Morpheaform/Sclerosing Histology Text: There were numerous aggregates of basaloid cells demonstrating an infiltrative pattern in a background of sclerotic stroma
Cheiloplasty (Complex) Text: A decision was made to reconstruct the defect with a  cheiloplasty.  The defect was undermined extensively.  Additional orbicularis oris muscle was excised with a 15 blade scalpel.  The defect was converted into a full thickness wedge to facilite a better cosmetic result.  Small vessels were then tied off with 5-0 monocyrl. The orbicularis oris, superficial fascia, adipose and dermis were then reapproximated.  After the deeper layers were approximated the epidermis was reapproximated with particular care given to realign the vermilion border.
Epidermal Autograft Text: The defect edges were debeveled with a #15 scalpel blade.  Given the location of the defect, shape of the defect and the proximity to free margins an epidermal autograft was deemed most appropriate.  Using a sterile surgical marker, the primary defect shape was transferred to the donor site. The epidermal graft was then harvested.  The skin graft was then placed in the primary defect and oriented appropriately.
Bi-Rhombic Flap Text: A bi-rhombic transposition flap was designed.  The flap was incised to the underlying adipose tissue.  The tissue surrounding the operative site was undermined extensively with blunt scissor dissection.    The flap was then incised, undermined, elevated, defatted, and carried over the primary defect.  The secondary defect was first closed by complex layered closure, moving the flap into the primary defect.  The superficial fascia and subcutaneous layers were closed with buried vertical mattress sutures.  The epidermis was then approximated with simple interrupted and running sutures.  Careful attention was given to eversion and even approximation of the wound edges.
Mart-1 - Negative Histology Text: MART-1 staining demonstrates a normal density and pattern of melanocytes along the dermal-epidermal junction. The surgical margins are negative for tumor cells.
Repair Type: Complex Repair
Scc Acantholytic Histology Text: There were aggregates of squamous cells in an acantholytic pattern.
Where Do You Want The Question To Include Opioid Counseling Located?: Case Summary Tab
Additional Debulk Details (Optional): Tumor
Staging Info: By selecting yes to the question above you will include information on AJCC 8 tumor staging in your Mohs note. Information on tumor staging will be automatically added for SCCs on the head and neck. AJCC 8 includes tumor size, tumor depth, perineural involvement and bone invasion.
Unna Boot Text: An Unna boot was placed to help immobilize the limb and facilitate more rapid healing.  Patient was provided instructions on removing the Unna boot at home in 1 week
Rectangular Flap Text: The defect edges were debeveled with a #15 scalpel blade. Given the location of the defect and the proximity to free margins a rectangular flap was deemed most appropriate. Using a sterile surgical marker, an appropriate rectangular flap was drawn incorporating the defect. The area thus outlined was incised deep to adipose tissue with a #15 scalpel blade. The skin margins were undermined to an appropriate distance in all directions utilizing iris scissors. Following this, the designed flap was carried over into the primary defect and sutured into place.
Nasolabial Transposition Flap Text: The defect edges were debeveled with a #15 scalpel blade.  Given the size, depth and location of the defect and the proximity to free margins a nasolabial transposition flap was deemed most appropriate. Using a sterile surgical marker, an appropriate flap was drawn incorporating the defect. The area thus outlined was incised with a #15 scalpel blade. The skin margins were undermined to an appropriate distance in all directions utilizing iris scissors. Following this, the designed flap was carried into the primary defect and sutured into place.
Nasalis Myocutaneous Flap Text: Using a #15 blade, an incision was made around the donor flap to the level of the nasalis muscle. Wide lateral undermining was then performed in both the subcutaneous plane above the nasalis muscle, and in a submuscular plane just above periosteum. This allowed the formation of a free nasalis muscle axial pedicle which was still attached to the actual cutaneous flap, increasing its mobility and vascular viability. Hemostasis was obtained with pinpoint electrocoagulation. The flap was mobilized into position and the pivotal anchor points positioned and stabilized with buried interrupted sutures. Subcutaneous and dermal tissues were closed in a multilayered fashion with sutures. Tissue redundancies were excised, and the epidermal edges were apposed without significant tension and sutured with sutures.
Flip-Flop Flap Text: The defect edges were debeveled with a #15 blade scalpel.  Given the location of the defect and the proximity to free margins a flip-flop flap was deemed most appropriate. Using a sterile surgical marker, the appropriate flap was drawn incorporating the defect and placing the expected incisions between the helical rim and antihelix where possible.  The area thus outlined was incised through and through with a #15 scalpel blade. Following this, the designed flap was carried over into the primary defect and sutured into place.
Which Instrument Did You Use For Dermabrasion?: Wire Brush
Bill 59 Modifier?: No - Continue to Bill 79 Modifier
Localized Dermabrasion With Sand Papertext: The patient was draped in routine manner.  Localized dermabrasion using sterile sand paper was performed in routine manner to papillary dermis. This spot dermabrasion is being performed to complete skin cancer reconstruction. It also will eliminate the other sun damaged precancerous cells that are known to be part of the regional effect of a lifetime's worth of sun exposure. This localized dermabrasion is therapeutic and should not be considered cosmetic in any regard.
Localized Dermabrasion With 15 Blade Text: The patient was draped in routine manner.  Localized dermabrasion using a 15 blade was performed in routine manner to papillary dermis. This spot dermabrasion is being performed to complete skin cancer reconstruction. It also will eliminate the other sun damaged precancerous cells that are known to be part of the regional effect of a lifetime's worth of sun exposure. This localized dermabrasion is therapeutic and should not be considered cosmetic in any regard.